# Patient Record
Sex: MALE | Race: WHITE | NOT HISPANIC OR LATINO | URBAN - METROPOLITAN AREA
[De-identification: names, ages, dates, MRNs, and addresses within clinical notes are randomized per-mention and may not be internally consistent; named-entity substitution may affect disease eponyms.]

---

## 2017-09-07 ENCOUNTER — GENERIC CONVERSION - ENCOUNTER (OUTPATIENT)
Dept: OTHER | Facility: OTHER | Age: 72
End: 2017-09-07

## 2017-09-14 RX ORDER — MULTIVITAMIN
1 TABLET ORAL DAILY
Status: ON HOLD | COMMUNITY
End: 2017-11-26

## 2017-09-14 RX ORDER — LOSARTAN POTASSIUM AND HYDROCHLOROTHIAZIDE 25; 100 MG/1; MG/1
1 TABLET ORAL DAILY
COMMUNITY

## 2017-09-14 RX ORDER — CHLORAL HYDRATE 500 MG
1000 CAPSULE ORAL DAILY
COMMUNITY

## 2017-09-14 RX ORDER — MONTELUKAST SODIUM 10 MG/1
10 TABLET ORAL
COMMUNITY

## 2017-09-14 RX ORDER — PANTOPRAZOLE SODIUM 40 MG/1
40 TABLET, DELAYED RELEASE ORAL DAILY
COMMUNITY

## 2017-09-14 RX ORDER — SIMVASTATIN 20 MG
20 TABLET ORAL
COMMUNITY

## 2017-09-14 RX ORDER — ASPIRIN 81 MG/1
162 TABLET, CHEWABLE ORAL DAILY
COMMUNITY

## 2017-09-14 NOTE — PRE-PROCEDURE INSTRUCTIONS
Pre-Surgery Instructions:   Medication Instructions    aspirin 81 mg chewable tablet Instructed patient per Anesthesia Guidelines   losartan-hydrochlorothiazide (HYZAAR) 100-25 MG per tablet Instructed patient per Anesthesia Guidelines   montelukast (SINGULAIR) 10 mg tablet Instructed patient per Anesthesia Guidelines   Multiple Vitamin (MULTIVITAMIN) tablet Instructed patient per Anesthesia Guidelines   Omega-3 Fatty Acids (FISH OIL) 1,000 mg Instructed patient per Anesthesia Guidelines   pantoprazole (PROTONIX) 40 mg tablet Instructed patient per Anesthesia Guidelines   simvastatin (ZOCOR) 20 mg tablet Instructed patient per Anesthesia Guidelines  Spoke to patient via telephone  As per anesthesia guidelines patient instructed to take protonix am of surgery with a sip of water  Patient instructed to stop all Aspirin/Vitamins/Supplements/NSAIDs 7 days before surgery  [facility] pre-op instructions reviewed with patient  Pre-op bathing reviewed with patient and patient instructed to  chlorhexidine soap

## 2017-09-25 ENCOUNTER — ANESTHESIA EVENT (OUTPATIENT)
Dept: PERIOP | Facility: HOSPITAL | Age: 72
End: 2017-09-25
Payer: MEDICARE

## 2017-09-26 ENCOUNTER — HOSPITAL ENCOUNTER (OUTPATIENT)
Facility: HOSPITAL | Age: 72
Setting detail: OUTPATIENT SURGERY
Discharge: HOME | End: 2017-09-26
Attending: OTOLARYNGOLOGY | Admitting: OTOLARYNGOLOGY
Payer: MEDICARE

## 2017-09-26 ENCOUNTER — GENERIC CONVERSION - ENCOUNTER (OUTPATIENT)
Dept: OTHER | Facility: OTHER | Age: 72
End: 2017-09-26

## 2017-09-26 ENCOUNTER — ANESTHESIA (OUTPATIENT)
Dept: PERIOP | Facility: HOSPITAL | Age: 72
End: 2017-09-26
Payer: MEDICARE

## 2017-09-26 VITALS
WEIGHT: 210 LBS | OXYGEN SATURATION: 93 % | HEIGHT: 72 IN | BODY MASS INDEX: 28.44 KG/M2 | HEART RATE: 64 BPM | RESPIRATION RATE: 17 BRPM | TEMPERATURE: 97.7 F | SYSTOLIC BLOOD PRESSURE: 119 MMHG | DIASTOLIC BLOOD PRESSURE: 71 MMHG

## 2017-09-26 DIAGNOSIS — C32.0 MALIGNANT NEOPLASM OF GLOTTIS (HCC): ICD-10-CM

## 2017-09-26 PROCEDURE — 88305 TISSUE EXAM BY PATHOLOGIST: CPT | Performed by: OTOLARYNGOLOGY

## 2017-09-26 PROCEDURE — 88342 IMHCHEM/IMCYTCHM 1ST ANTB: CPT | Performed by: OTOLARYNGOLOGY

## 2017-09-26 RX ORDER — ONDANSETRON 2 MG/ML
4 INJECTION INTRAMUSCULAR; INTRAVENOUS EVERY 6 HOURS PRN
Status: DISCONTINUED | OUTPATIENT
Start: 2017-09-26 | End: 2017-09-26 | Stop reason: HOSPADM

## 2017-09-26 RX ORDER — FENTANYL CITRATE 50 UG/ML
INJECTION, SOLUTION INTRAMUSCULAR; INTRAVENOUS AS NEEDED
Status: DISCONTINUED | OUTPATIENT
Start: 2017-09-26 | End: 2017-09-26 | Stop reason: SURG

## 2017-09-26 RX ORDER — ROCURONIUM BROMIDE 10 MG/ML
INJECTION, SOLUTION INTRAVENOUS AS NEEDED
Status: DISCONTINUED | OUTPATIENT
Start: 2017-09-26 | End: 2017-09-26 | Stop reason: SURG

## 2017-09-26 RX ORDER — GLYCOPYRROLATE 0.2 MG/ML
INJECTION INTRAMUSCULAR; INTRAVENOUS AS NEEDED
Status: DISCONTINUED | OUTPATIENT
Start: 2017-09-26 | End: 2017-09-26 | Stop reason: SURG

## 2017-09-26 RX ORDER — LIDOCAINE HYDROCHLORIDE 10 MG/ML
INJECTION, SOLUTION INFILTRATION; PERINEURAL AS NEEDED
Status: DISCONTINUED | OUTPATIENT
Start: 2017-09-26 | End: 2017-09-26 | Stop reason: SURG

## 2017-09-26 RX ORDER — ALBUTEROL SULFATE 2.5 MG/3ML
2.5 SOLUTION RESPIRATORY (INHALATION) ONCE AS NEEDED
Status: DISCONTINUED | OUTPATIENT
Start: 2017-09-26 | End: 2017-09-26 | Stop reason: HOSPADM

## 2017-09-26 RX ORDER — MEPERIDINE HYDROCHLORIDE 50 MG/ML
12.5 INJECTION INTRAMUSCULAR; INTRAVENOUS; SUBCUTANEOUS AS NEEDED
Status: DISCONTINUED | OUTPATIENT
Start: 2017-09-26 | End: 2017-09-26 | Stop reason: HOSPADM

## 2017-09-26 RX ORDER — FENTANYL CITRATE/PF 50 MCG/ML
50 SYRINGE (ML) INJECTION
Status: DISCONTINUED | OUTPATIENT
Start: 2017-09-26 | End: 2017-09-26 | Stop reason: HOSPADM

## 2017-09-26 RX ORDER — MAGNESIUM HYDROXIDE 1200 MG/15ML
LIQUID ORAL AS NEEDED
Status: DISCONTINUED | OUTPATIENT
Start: 2017-09-26 | End: 2017-09-26 | Stop reason: HOSPADM

## 2017-09-26 RX ORDER — PROPOFOL 10 MG/ML
INJECTION, EMULSION INTRAVENOUS AS NEEDED
Status: DISCONTINUED | OUTPATIENT
Start: 2017-09-26 | End: 2017-09-26 | Stop reason: SURG

## 2017-09-26 RX ORDER — OXYCODONE HYDROCHLORIDE AND ACETAMINOPHEN 5; 325 MG/1; MG/1
2 TABLET ORAL EVERY 4 HOURS PRN
Status: DISCONTINUED | OUTPATIENT
Start: 2017-09-26 | End: 2017-09-26 | Stop reason: HOSPADM

## 2017-09-26 RX ORDER — SODIUM CHLORIDE 9 MG/ML
125 INJECTION, SOLUTION INTRAVENOUS CONTINUOUS
Status: DISCONTINUED | OUTPATIENT
Start: 2017-09-26 | End: 2017-09-26 | Stop reason: HOSPADM

## 2017-09-26 RX ORDER — OXYMETAZOLINE HYDROCHLORIDE 0.05 G/100ML
SPRAY NASAL AS NEEDED
Status: DISCONTINUED | OUTPATIENT
Start: 2017-09-26 | End: 2017-09-26 | Stop reason: HOSPADM

## 2017-09-26 RX ORDER — OXYCODONE HYDROCHLORIDE AND ACETAMINOPHEN 5; 325 MG/1; MG/1
TABLET ORAL
Refills: 0
Start: 2017-09-26 | End: 2017-11-27 | Stop reason: HOSPADM

## 2017-09-26 RX ADMIN — SODIUM CHLORIDE 125 ML/HR: 0.9 INJECTION, SOLUTION INTRAVENOUS at 06:57

## 2017-09-26 RX ADMIN — ROCURONIUM BROMIDE 30 MG: 10 INJECTION, SOLUTION INTRAVENOUS at 08:08

## 2017-09-26 RX ADMIN — NEOSTIGMINE METHYLSULFATE 3 MG: 1 INJECTION, SOLUTION INTRAMUSCULAR; INTRAVENOUS; SUBCUTANEOUS at 08:25

## 2017-09-26 RX ADMIN — PROPOFOL 200 MG: 10 INJECTION, EMULSION INTRAVENOUS at 08:08

## 2017-09-26 RX ADMIN — FENTANYL CITRATE 100 MCG: 50 INJECTION, SOLUTION INTRAMUSCULAR; INTRAVENOUS at 08:08

## 2017-09-26 RX ADMIN — DEXAMETHASONE SODIUM PHOSPHATE 8 MG: 10 INJECTION INTRAMUSCULAR; INTRAVENOUS at 08:13

## 2017-09-26 RX ADMIN — ONDANSETRON HYDROCHLORIDE 4 MG: 2 INJECTION, SOLUTION INTRAVENOUS at 08:31

## 2017-09-26 RX ADMIN — GLYCOPYRROLATE 0.4 MG: 0.2 INJECTION, SOLUTION INTRAMUSCULAR; INTRAVENOUS at 08:25

## 2017-09-26 RX ADMIN — LIDOCAINE HYDROCHLORIDE 60 MG: 10 INJECTION, SOLUTION INFILTRATION; PERINEURAL at 08:08

## 2017-09-26 NOTE — DISCHARGE INSTRUCTIONS
[facility]    POSTOPERATIVE LARYNGOSCOPY INSTRUCTION    1  If any vocal cord biopsies were taken, you should rest voiced for 7 days  He should not whisper or shout  2   If you have any severe pain not alleviated by medication, fever of 101°F or greater, or difficulty breathing, please call our office at [phone] or [phone] or go directly to the emergency room  3   No smoking  Avoid second hand smoke exposure  4   Coughing up blood mixed with mucus may be normal   Call for any significant bleeding

## 2017-09-26 NOTE — INTERVAL H&P NOTE
H&P reviewed  After examining the patient I find no changes in the patients condition since the H&P had been written

## 2017-09-26 NOTE — PROGRESS NOTES
D/C instructions reviewed w/ pt & spouse, verbalized understanding  Pt remains on voice rest, given paper to communicate on  Denies pain or difficulty swallowing  No bleeding noted

## 2017-10-13 ENCOUNTER — GENERIC CONVERSION - ENCOUNTER (OUTPATIENT)
Dept: OTHER | Facility: OTHER | Age: 72
End: 2017-10-13

## 2017-11-02 ENCOUNTER — APPOINTMENT (OUTPATIENT)
Dept: LAB | Facility: CLINIC | Age: 72
End: 2017-11-02
Payer: MEDICARE

## 2017-11-02 ENCOUNTER — ALLSCRIPTS OFFICE VISIT (OUTPATIENT)
Dept: OTHER | Facility: OTHER | Age: 72
End: 2017-11-02

## 2017-11-02 DIAGNOSIS — D13.2 BENIGN NEOPLASM OF DUODENUM: ICD-10-CM

## 2017-11-02 LAB
ALBUMIN SERPL BCP-MCNC: 3.5 G/DL (ref 3.5–5)
ALP SERPL-CCNC: 66 U/L (ref 46–116)
ALT SERPL W P-5'-P-CCNC: 26 U/L (ref 12–78)
ANION GAP SERPL CALCULATED.3IONS-SCNC: 6 MMOL/L (ref 4–13)
APTT PPP: 30 SECONDS (ref 23–35)
AST SERPL W P-5'-P-CCNC: 21 U/L (ref 5–45)
BASOPHILS # BLD AUTO: 0.03 THOUSANDS/ΜL (ref 0–0.1)
BASOPHILS NFR BLD AUTO: 1 % (ref 0–1)
BILIRUB SERPL-MCNC: 0.27 MG/DL (ref 0.2–1)
BUN SERPL-MCNC: 17 MG/DL (ref 5–25)
CALCIUM SERPL-MCNC: 8.7 MG/DL (ref 8.3–10.1)
CHLORIDE SERPL-SCNC: 105 MMOL/L (ref 100–108)
CO2 SERPL-SCNC: 30 MMOL/L (ref 21–32)
CREAT SERPL-MCNC: 0.92 MG/DL (ref 0.6–1.3)
EOSINOPHIL # BLD AUTO: 0.15 THOUSAND/ΜL (ref 0–0.61)
EOSINOPHIL NFR BLD AUTO: 3 % (ref 0–6)
ERYTHROCYTE [DISTWIDTH] IN BLOOD BY AUTOMATED COUNT: 16.2 % (ref 11.6–15.1)
EST. AVERAGE GLUCOSE BLD GHB EST-MCNC: 128 MG/DL
GFR SERPL CREATININE-BSD FRML MDRD: 83 ML/MIN/1.73SQ M
GLUCOSE P FAST SERPL-MCNC: 182 MG/DL (ref 65–99)
HBA1C MFR BLD: 6.1 % (ref 4.2–6.3)
HCT VFR BLD AUTO: 37.5 % (ref 36.5–49.3)
HGB BLD-MCNC: 12.6 G/DL (ref 12–17)
INR PPP: 1.06 (ref 0.86–1.16)
LYMPHOCYTES # BLD AUTO: 1.34 THOUSANDS/ΜL (ref 0.6–4.47)
LYMPHOCYTES NFR BLD AUTO: 27 % (ref 14–44)
MCH RBC QN AUTO: 28.6 PG (ref 26.8–34.3)
MCHC RBC AUTO-ENTMCNC: 33.6 G/DL (ref 31.4–37.4)
MCV RBC AUTO: 85 FL (ref 82–98)
MONOCYTES # BLD AUTO: 0.47 THOUSAND/ΜL (ref 0.17–1.22)
MONOCYTES NFR BLD AUTO: 10 % (ref 4–12)
NEUTROPHILS # BLD AUTO: 2.9 THOUSANDS/ΜL (ref 1.85–7.62)
NEUTS SEG NFR BLD AUTO: 59 % (ref 43–75)
NRBC BLD AUTO-RTO: 0 /100 WBCS
PLATELET # BLD AUTO: 192 THOUSANDS/UL (ref 149–390)
PMV BLD AUTO: 9.2 FL (ref 8.9–12.7)
POTASSIUM SERPL-SCNC: 3.8 MMOL/L (ref 3.5–5.3)
PROT SERPL-MCNC: 6.9 G/DL (ref 6.4–8.2)
PROTHROMBIN TIME: 13.8 SECONDS (ref 12.1–14.4)
RBC # BLD AUTO: 4.41 MILLION/UL (ref 3.88–5.62)
SODIUM SERPL-SCNC: 141 MMOL/L (ref 136–145)
WBC # BLD AUTO: 4.9 THOUSAND/UL (ref 4.31–10.16)

## 2017-11-02 PROCEDURE — 85730 THROMBOPLASTIN TIME PARTIAL: CPT

## 2017-11-02 PROCEDURE — 85610 PROTHROMBIN TIME: CPT

## 2017-11-02 PROCEDURE — 80053 COMPREHEN METABOLIC PANEL: CPT

## 2017-11-02 PROCEDURE — 85025 COMPLETE CBC W/AUTO DIFF WBC: CPT

## 2017-11-02 PROCEDURE — 83036 HEMOGLOBIN GLYCOSYLATED A1C: CPT

## 2017-11-02 PROCEDURE — 36415 COLL VENOUS BLD VENIPUNCTURE: CPT

## 2017-11-03 ENCOUNTER — HOSPITAL ENCOUNTER (OUTPATIENT)
Dept: RADIOLOGY | Facility: HOSPITAL | Age: 72
Discharge: HOME | End: 2017-11-03
Attending: RADIOLOGY
Payer: MEDICARE

## 2017-11-03 DIAGNOSIS — Z76.89 REFERRAL OF PATIENT WITHOUT EXAMINATION OR TREATMENT: ICD-10-CM

## 2017-11-03 NOTE — CONSULTS
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)    Plan  Duodenal adenoma    · Schedule Surgery Treatment  Procedure  Status: Hold For - Scheduling  Requested for:  [id number]   Ordered; For: Duodenal adenoma; Ordered By: Hernan Morris Performed:  Due: [id number]    Discussion/Summary  Discussion Summary:   80-year-old male with what appears to be a duodenal polyp with extensive high-grade dysplasia  Based on the previous endoscopy report it is unclear that this can be resected endoscopically  I've recommended that he consider surgical resection for this  Hopefully this is just a polyp that can be removed at the base  Alternatively, if this is a broad-based lesion involving the medial duodenal wall or pancreas, pancreaticoduodenectomy may be required  I explained the risks including bleeding, infection, recurrence, need for further surgery, wound complications, adjacent organ injury, anastomotic leak, sepsis, MI, DVT, stroke, pulmonary embolism, and death  Informed consent was obtained  We will schedule this at our earliest mutual convenience  He is agreeable to this  All his questions were answered  Goals and Barriers: The patient has the current Goals: Cure  The patent has the current Barriers: None  Patient's Capacity to Self-Care: Patient is able to Self-Care  Medication SE Review and Pt Understands Tx: The treatment plan was reviewed with the patient/guardian  The patient/guardian understands and agrees with the treatment plan   Self Referrals:   Self Referrals: No      Chief Complaint  Chief Complaint Free Text Note Form: Pt here for consult for dysplastic duodenal adenoma, found on recent PET  Pt recently dx'd with head/neck SCC  C/o hoarseness, a feeling of fullness and occasional nausea  Denies pain, vomiting or weight loss  History of Present Illness  HPI: 59-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   He was found to have a recurrence and had a staging workup with a PET/CT  There was intense uptake in the proximal duodenum with an SUV of 14 7  An intraluminal density was seen  The gastric antrum was dilated  I personally reviewed the films  The patient comes in now to discuss further therapy  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  Patient then underwent EGD on October 16, 2017  This revealed a large duodenal bulb mass protruding through the pylorus  This was friable  The duodenum could not be entered  Pathology revealed tubular adenoma with extensive high-grade dysplasia  The patient denies any abdominal pain, nausea or vomiting, he does have some occasional bloating but no significant reflux or nausea  No unintentional weight loss  Review of Systems  Complete Male ROS Surg Onc:   Constitutional: The patient denies new or recent history of general fatigue, no recent weight loss, no change in appetite  Eyes: No complaints of visual problems, no scleral icterus  ENT: hoarseness, but-- no earache,-- no nosebleeds,-- no sore throat,-- no hearing loss,-- no difficultry swallowing,-- no tinnitus-- and-- no new masses in head, oral cavity, or neck  Cardiovascular: No complaints of chest pain, no palpitations, no ankle edema  Respiratory: No complaints of shortness of breath, no cough  Gastrointestinal: nausea, but-- no abdominal pain,-- no vomiting,-- no constipation,-- no diarrhea,-- no blood in stools,-- no jaundice-- and-- no pale stools  Genitourinary: No complaints of dysuria, no hematuria, no nocturia, no frequent urination, no urethral discharge  Musculoskeletal: No complaints of weakness, paralysis, joint stiffness or arthralgias,  Integumentary: No complaints of rash, no new lesions  Neurological: No complaints of convulsions, no seizures, no dizziness  Hematologic/Lymphatic: No complaints of easy bruising  ROS Reviewed:   ROS reviewed  Past Medical History  1   History of Acute appendicitis (540 9) (K35 80)   2  History of colonic polyps (V12 72) (Z86 010)  Active Problems And Past Medical History Reviewed: The active problems and past medical history were reviewed and updated today  Surgical History  1  History of Appendectomy   2  History of Hernia Repair   3  History of Prostate Surgery   4  History of Radiation Therapy  Surgical History Reviewed: The surgical history was reviewed and updated today  Family History  Mother    1  Family history of malignant neoplasm of esophagus (V16 0) (Z80 0)  Father    2  No pertinent family history  Sister    3  Family history of malignant neoplasm of cervix (V16 49) (Z80 49)  Family History Reviewed: The family history was reviewed and updated today  Social History   · Former smoker (V24 71) (F73 091)   · No alcohol use  Social History Reviewed: The social history was reviewed and updated today  Current Meds   1  Adult Aspirin EC Low Strength 81 MG Oral Tablet Delayed Release; Therapy: (Recorded:02Nov2017) to Recorded   2  Losartan Potassium-HCTZ 100-25 MG Oral Tablet; Therapy: (Recorded:02Nov2017) to Recorded   3  Montelukast Sodium 10 MG Oral Tablet; Therapy: [phone]) to Recorded   4  Pantoprazole Sodium 40 MG Oral Tablet Delayed Release; Therapy: [phone]) to Recorded   5  Simvastatin 20 MG Oral Tablet; Therapy: [phone]) to Recorded  Medication List Reviewed: The medication list was reviewed and updated today  Allergies  1  No Known Drug Allergies    Vitals  Vital Signs    Recorded: [id number] 09:56AM   Temperature 97 7 F   Heart Rate 70   Respiration 18   Systolic 539   Diastolic 70   Height 6 ft 2 in   Weight 214 lb    BMI Calculated 27 48   BSA Calculated 2 24     Physical Exam    Constitutional: General appearance: The Patient is well-developed, well-nourished male who appears his stated age in no acute distress  He is pleasant and talkative      HEENT: ALPESH, EOMI and the sclerae are anicteric  -- There is no oral pathology noted  -- There is no nasal pathology noted  -- The thyroid is normal to inspection and palpation  -- There is no appreciable cervical adenopathy   -- There are no carotid bruits  -- The Cranial Nerves II - XII are grossly intact  -- Neck is supple without adenopathy  Chest: The chest is normal to inspection  -- The lungs are clear to auscultation  -- There are bilaterally symmetrical breath sounds  -- There is a RRR, normal S1 and S2, without murmurs, rub or gallop  -- The pulses are normal at the radial and dorsalis pedis and symmetrical     Abdomen: The abdomen is normal to inspection and percussion  It is soft, non-tender  There are normal bowel sounds  There are no abnormal masses  -- There is no evidence of hepatosplenomegaly  -- He does not have an umbilical hernia  Extremities: There is no clubbing or cyanosis  -- There is no edema  -- Sensation is intact to light touch  Neuro: Grossly nonfocal     Lymphatic: no evidence of cervical adenopathy bilaterally  -- no evidence of axillary adenopathy bilaterally  -- no evidence of inguinal adenopathy bilaterally  Skin: Warm, anicteric         Signatures   Electronically signed by : GAUTAM Lunsford ; Nov 2 2017 10:28AM EST                       (Author)

## 2017-11-07 ENCOUNTER — GENERIC CONVERSION - ENCOUNTER (OUTPATIENT)
Dept: OTHER | Facility: OTHER | Age: 72
End: 2017-11-07

## 2017-11-15 ENCOUNTER — ANESTHESIA EVENT (OUTPATIENT)
Dept: PERIOP | Facility: HOSPITAL | Age: 72
DRG: 328 | End: 2017-11-15
Payer: MEDICARE

## 2017-11-17 NOTE — PRE-PROCEDURE INSTRUCTIONS
No outpatient prescriptions have been marked as taking for the 11/20/17 encounter [facility] Encounter)  1  Hx of throat CA - he has throat hoarseness times 2 years with a raspy voice  Had radiation 2 years sgo   had a bx this year with no complications  Denies dysphasia, denies choking, eats regular diet  Thin liquids  Solid foods  Denies swollen neck  Denies pain with swallowing  States all WNL  He can walk up a flight of stairs  No CP   Mets greater than 4  States he can run up stairs  Denies CP and SOB

## 2017-11-20 ENCOUNTER — HOSPITAL ENCOUNTER (INPATIENT)
Facility: HOSPITAL | Age: 72
LOS: 7 days | Discharge: HOME | DRG: 328 | End: 2017-11-27
Attending: SURGERY | Admitting: SURGERY
Payer: MEDICARE

## 2017-11-20 ENCOUNTER — ANESTHESIA (OUTPATIENT)
Dept: PERIOP | Facility: HOSPITAL | Age: 72
DRG: 328 | End: 2017-11-20
Payer: MEDICARE

## 2017-11-20 DIAGNOSIS — D13.2 BENIGN NEOPLASM OF DUODENUM: ICD-10-CM

## 2017-11-20 PROBLEM — K31.89 DUODENAL MASS: Status: ACTIVE | Noted: 2017-11-20

## 2017-11-20 LAB
ABO GROUP BLD: NORMAL
ANION GAP SERPL CALCULATED.3IONS-SCNC: 8 MMOL/L (ref 4–13)
BASE EXCESS BLDA CALC-SCNC: -1 MMOL/L (ref -2–3)
BLD GP AB SCN SERPL QL: POSITIVE
BLOOD GROUP ANTIBODIES SERPL: NORMAL
BLOOD GROUP ANTIBODIES SERPL: NORMAL
BUN SERPL-MCNC: 14 MG/DL (ref 5–25)
CA-I BLD-SCNC: 1.21 MMOL/L (ref 1.12–1.32)
CALCIUM SERPL-MCNC: 8.3 MG/DL (ref 8.3–10.1)
CHLORIDE SERPL-SCNC: 106 MMOL/L (ref 100–108)
CO2 SERPL-SCNC: 27 MMOL/L (ref 21–32)
CREAT SERPL-MCNC: 0.83 MG/DL (ref 0.6–1.3)
E AG RBC QL: NEGATIVE
ERYTHROCYTE [DISTWIDTH] IN BLOOD BY AUTOMATED COUNT: 13.6 % (ref 11.6–15.1)
GFR SERPL CREATININE-BSD FRML MDRD: 88 ML/MIN/1.73SQ M
GLUCOSE SERPL-MCNC: 139 MG/DL (ref 65–140)
GLUCOSE SERPL-MCNC: 158 MG/DL (ref 65–140)
GLUCOSE SERPL-MCNC: 173 MG/DL (ref 65–140)
HCO3 BLDA-SCNC: 25.5 MMOL/L (ref 24–30)
HCT VFR BLD AUTO: 34.8 % (ref 36.5–49.3)
HCT VFR BLD CALC: 34 % (ref 36.5–49.3)
HGB BLD-MCNC: 12.2 G/DL (ref 12–17)
HGB BLDA-MCNC: 11.6 G/DL (ref 12–17)
MAGNESIUM SERPL-MCNC: 1.9 MG/DL (ref 1.6–2.6)
MCH RBC QN AUTO: 29.2 PG (ref 26.8–34.3)
MCHC RBC AUTO-ENTMCNC: 35.1 G/DL (ref 31.4–37.4)
MCV RBC AUTO: 83 FL (ref 82–98)
PCO2 BLD: 27 MMOL/L (ref 21–32)
PCO2 BLD: 47.9 MM HG (ref 42–50)
PH BLD: 7.33 [PH] (ref 7.3–7.4)
PLATELET # BLD AUTO: 167 THOUSANDS/UL (ref 149–390)
PMV BLD AUTO: 8.5 FL (ref 8.9–12.7)
PO2 BLD: 136 MM HG (ref 35–45)
POTASSIUM BLD-SCNC: 3.6 MMOL/L (ref 3.5–5.3)
POTASSIUM SERPL-SCNC: 3.7 MMOL/L (ref 3.5–5.3)
RBC # BLD AUTO: 4.18 MILLION/UL (ref 3.88–5.62)
RH BLD: NEGATIVE
SAO2 % BLD FROM PO2: 99 % (ref 95–98)
SODIUM BLD-SCNC: 139 MMOL/L (ref 136–145)
SODIUM SERPL-SCNC: 141 MMOL/L (ref 136–145)
SPECIMEN EXPIRATION DATE: NORMAL
SPECIMEN SOURCE: ABNORMAL
WBC # BLD AUTO: 7.53 THOUSAND/UL (ref 4.31–10.16)

## 2017-11-20 PROCEDURE — 86922 COMPATIBILITY TEST ANTIGLOB: CPT

## 2017-11-20 PROCEDURE — 82330 ASSAY OF CALCIUM: CPT

## 2017-11-20 PROCEDURE — 85027 COMPLETE CBC AUTOMATED: CPT | Performed by: SURGERY

## 2017-11-20 PROCEDURE — 88304 TISSUE EXAM BY PATHOLOGIST: CPT | Performed by: SURGERY

## 2017-11-20 PROCEDURE — 03HY32Z INSERTION OF MONITORING DEVICE INTO UPPER ARTERY, PERCUTANEOUS APPROACH: ICD-10-PCS | Performed by: SURGERY

## 2017-11-20 PROCEDURE — 4A133B1 MONITORING OF ARTERIAL PRESSURE, PERIPHERAL, PERCUTANEOUS APPROACH: ICD-10-PCS | Performed by: SURGERY

## 2017-11-20 PROCEDURE — 80048 BASIC METABOLIC PNL TOTAL CA: CPT | Performed by: SURGERY

## 2017-11-20 PROCEDURE — 0DU907Z SUPPLEMENT DUODENUM WITH AUTOLOGOUS TISSUE SUBSTITUTE, OPEN APPROACH: ICD-10-PCS | Performed by: SURGERY

## 2017-11-20 PROCEDURE — 82947 ASSAY GLUCOSE BLOOD QUANT: CPT

## 2017-11-20 PROCEDURE — 88309 TISSUE EXAM BY PATHOLOGIST: CPT | Performed by: SURGERY

## 2017-11-20 PROCEDURE — 82948 REAGENT STRIP/BLOOD GLUCOSE: CPT

## 2017-11-20 PROCEDURE — 88342 IMHCHEM/IMCYTCHM 1ST ANTB: CPT | Performed by: SURGERY

## 2017-11-20 PROCEDURE — 4A133J1 MONITORING OF ARTERIAL PULSE, PERIPHERAL, PERCUTANEOUS APPROACH: ICD-10-PCS | Performed by: SURGERY

## 2017-11-20 PROCEDURE — 0DB60ZZ EXCISION OF STOMACH, OPEN APPROACH: ICD-10-PCS | Performed by: SURGERY

## 2017-11-20 PROCEDURE — 88307 TISSUE EXAM BY PATHOLOGIST: CPT | Performed by: SURGERY

## 2017-11-20 PROCEDURE — C9113 INJ PANTOPRAZOLE SODIUM, VIA: HCPCS | Performed by: SURGERY

## 2017-11-20 PROCEDURE — 84295 ASSAY OF SERUM SODIUM: CPT

## 2017-11-20 PROCEDURE — 84132 ASSAY OF SERUM POTASSIUM: CPT

## 2017-11-20 PROCEDURE — 85014 HEMATOCRIT: CPT

## 2017-11-20 PROCEDURE — 86905 BLOOD TYPING RBC ANTIGENS: CPT

## 2017-11-20 PROCEDURE — 86921 COMPATIBILITY TEST INCUBATE: CPT

## 2017-11-20 PROCEDURE — 86850 RBC ANTIBODY SCREEN: CPT | Performed by: SURGERY

## 2017-11-20 PROCEDURE — 94762 N-INVAS EAR/PLS OXIMTRY CONT: CPT

## 2017-11-20 PROCEDURE — 86870 RBC ANTIBODY IDENTIFICATION: CPT | Performed by: SURGERY

## 2017-11-20 PROCEDURE — 83735 ASSAY OF MAGNESIUM: CPT | Performed by: SURGERY

## 2017-11-20 PROCEDURE — 82803 BLOOD GASES ANY COMBINATION: CPT

## 2017-11-20 PROCEDURE — 86900 BLOOD TYPING SEROLOGIC ABO: CPT | Performed by: SURGERY

## 2017-11-20 PROCEDURE — 86901 BLOOD TYPING SEROLOGIC RH(D): CPT | Performed by: SURGERY

## 2017-11-20 PROCEDURE — 88341 IMHCHEM/IMCYTCHM EA ADD ANTB: CPT | Performed by: SURGERY

## 2017-11-20 RX ORDER — LIDOCAINE HYDROCHLORIDE 10 MG/ML
INJECTION, SOLUTION INFILTRATION; PERINEURAL AS NEEDED
Status: DISCONTINUED | OUTPATIENT
Start: 2017-11-20 | End: 2017-11-20 | Stop reason: SURG

## 2017-11-20 RX ORDER — ONDANSETRON 2 MG/ML
4 INJECTION INTRAMUSCULAR; INTRAVENOUS EVERY 4 HOURS PRN
Status: DISCONTINUED | OUTPATIENT
Start: 2017-11-20 | End: 2017-11-20 | Stop reason: SDUPTHER

## 2017-11-20 RX ORDER — ONDANSETRON 2 MG/ML
4 INJECTION INTRAMUSCULAR; INTRAVENOUS ONCE AS NEEDED
Status: DISCONTINUED | OUTPATIENT
Start: 2017-11-20 | End: 2017-11-20 | Stop reason: HOSPADM

## 2017-11-20 RX ORDER — SUCCINYLCHOLINE CHLORIDE 20 MG/ML
INJECTION INTRAMUSCULAR; INTRAVENOUS AS NEEDED
Status: DISCONTINUED | OUTPATIENT
Start: 2017-11-20 | End: 2017-11-20 | Stop reason: SURG

## 2017-11-20 RX ORDER — MIDAZOLAM HYDROCHLORIDE 1 MG/ML
INJECTION INTRAMUSCULAR; INTRAVENOUS AS NEEDED
Status: DISCONTINUED | OUTPATIENT
Start: 2017-11-20 | End: 2017-11-20 | Stop reason: SURG

## 2017-11-20 RX ORDER — SODIUM CHLORIDE, SODIUM LACTATE, POTASSIUM CHLORIDE, CALCIUM CHLORIDE 600; 310; 30; 20 MG/100ML; MG/100ML; MG/100ML; MG/100ML
20 INJECTION, SOLUTION INTRAVENOUS CONTINUOUS
Status: DISCONTINUED | OUTPATIENT
Start: 2017-11-20 | End: 2017-11-23

## 2017-11-20 RX ORDER — SODIUM CHLORIDE, SODIUM LACTATE, POTASSIUM CHLORIDE, CALCIUM CHLORIDE 600; 310; 30; 20 MG/100ML; MG/100ML; MG/100ML; MG/100ML
50 INJECTION, SOLUTION INTRAVENOUS CONTINUOUS
Status: DISCONTINUED | OUTPATIENT
Start: 2017-11-20 | End: 2017-11-20

## 2017-11-20 RX ORDER — SODIUM CHLORIDE 9 MG/ML
125 INJECTION, SOLUTION INTRAVENOUS CONTINUOUS
Status: DISCONTINUED | OUTPATIENT
Start: 2017-11-20 | End: 2017-11-22

## 2017-11-20 RX ORDER — PROPOFOL 10 MG/ML
INJECTION, EMULSION INTRAVENOUS AS NEEDED
Status: DISCONTINUED | OUTPATIENT
Start: 2017-11-20 | End: 2017-11-20 | Stop reason: SURG

## 2017-11-20 RX ORDER — NALOXONE HYDROCHLORIDE 0.4 MG/ML
0.1 INJECTION, SOLUTION INTRAMUSCULAR; INTRAVENOUS; SUBCUTANEOUS AS NEEDED
Status: DISCONTINUED | OUTPATIENT
Start: 2017-11-20 | End: 2017-11-26

## 2017-11-20 RX ORDER — ALBUMIN, HUMAN INJ 5% 5 %
SOLUTION INTRAVENOUS CONTINUOUS PRN
Status: DISCONTINUED | OUTPATIENT
Start: 2017-11-20 | End: 2017-11-20 | Stop reason: SURG

## 2017-11-20 RX ORDER — ONDANSETRON 2 MG/ML
4 INJECTION INTRAMUSCULAR; INTRAVENOUS EVERY 4 HOURS PRN
Status: DISCONTINUED | OUTPATIENT
Start: 2017-11-20 | End: 2017-11-27 | Stop reason: HOSPADM

## 2017-11-20 RX ORDER — PANTOPRAZOLE SODIUM 40 MG/1
40 INJECTION, POWDER, FOR SOLUTION INTRAVENOUS
Status: DISCONTINUED | OUTPATIENT
Start: 2017-11-20 | End: 2017-11-26

## 2017-11-20 RX ORDER — METOCLOPRAMIDE HYDROCHLORIDE 5 MG/ML
10 INJECTION INTRAMUSCULAR; INTRAVENOUS ONCE
Status: COMPLETED | OUTPATIENT
Start: 2017-11-20 | End: 2017-11-20

## 2017-11-20 RX ORDER — GLYCOPYRROLATE 0.2 MG/ML
INJECTION INTRAMUSCULAR; INTRAVENOUS AS NEEDED
Status: DISCONTINUED | OUTPATIENT
Start: 2017-11-20 | End: 2017-11-20 | Stop reason: SURG

## 2017-11-20 RX ORDER — FENTANYL CITRATE 50 UG/ML
INJECTION, SOLUTION INTRAMUSCULAR; INTRAVENOUS AS NEEDED
Status: DISCONTINUED | OUTPATIENT
Start: 2017-11-20 | End: 2017-11-20 | Stop reason: SURG

## 2017-11-20 RX ORDER — METOCLOPRAMIDE HYDROCHLORIDE 5 MG/ML
5 INJECTION INTRAMUSCULAR; INTRAVENOUS EVERY 6 HOURS PRN
Status: DISCONTINUED | OUTPATIENT
Start: 2017-11-20 | End: 2017-11-27 | Stop reason: HOSPADM

## 2017-11-20 RX ORDER — EPHEDRINE SULFATE 50 MG/ML
INJECTION, SOLUTION INTRAVENOUS AS NEEDED
Status: DISCONTINUED | OUTPATIENT
Start: 2017-11-20 | End: 2017-11-20 | Stop reason: SURG

## 2017-11-20 RX ORDER — HEPARIN SODIUM 5000 [USP'U]/ML
5000 INJECTION, SOLUTION INTRAVENOUS; SUBCUTANEOUS EVERY 12 HOURS SCHEDULED
Status: DISCONTINUED | OUTPATIENT
Start: 2017-11-20 | End: 2017-11-25

## 2017-11-20 RX ORDER — ROCURONIUM BROMIDE 10 MG/ML
INJECTION, SOLUTION INTRAVENOUS AS NEEDED
Status: DISCONTINUED | OUTPATIENT
Start: 2017-11-20 | End: 2017-11-20 | Stop reason: SURG

## 2017-11-20 RX ORDER — ONDANSETRON 2 MG/ML
INJECTION INTRAMUSCULAR; INTRAVENOUS AS NEEDED
Status: DISCONTINUED | OUTPATIENT
Start: 2017-11-20 | End: 2017-11-20 | Stop reason: SURG

## 2017-11-20 RX ORDER — MAGNESIUM HYDROXIDE 1200 MG/15ML
LIQUID ORAL AS NEEDED
Status: DISCONTINUED | OUTPATIENT
Start: 2017-11-20 | End: 2017-11-20 | Stop reason: HOSPADM

## 2017-11-20 RX ORDER — SODIUM CHLORIDE 9 MG/ML
INJECTION, SOLUTION INTRAVENOUS CONTINUOUS PRN
Status: DISCONTINUED | OUTPATIENT
Start: 2017-11-20 | End: 2017-11-20 | Stop reason: SURG

## 2017-11-20 RX ORDER — MONTELUKAST SODIUM 10 MG/1
10 TABLET ORAL
Status: DISCONTINUED | OUTPATIENT
Start: 2017-11-20 | End: 2017-11-27 | Stop reason: HOSPADM

## 2017-11-20 RX ADMIN — LIDOCAINE HYDROCHLORIDE 50 MG: 10 INJECTION, SOLUTION INFILTRATION; PERINEURAL at 11:08

## 2017-11-20 RX ADMIN — METRONIDAZOLE 500 MG: 500 INJECTION, SOLUTION INTRAVENOUS at 11:20

## 2017-11-20 RX ADMIN — FENTANYL CITRATE 25 MCG: 50 INJECTION, SOLUTION INTRAMUSCULAR; INTRAVENOUS at 13:23

## 2017-11-20 RX ADMIN — ALBUMIN HUMAN: 0.05 INJECTION, SOLUTION INTRAVENOUS at 11:55

## 2017-11-20 RX ADMIN — SODIUM CHLORIDE 125 ML/HR: 0.9 INJECTION, SOLUTION INTRAVENOUS at 20:56

## 2017-11-20 RX ADMIN — SODIUM CHLORIDE: 0.9 INJECTION, SOLUTION INTRAVENOUS at 11:13

## 2017-11-20 RX ADMIN — PANTOPRAZOLE SODIUM 40 MG: 40 INJECTION, POWDER, FOR SOLUTION INTRAVENOUS at 16:10

## 2017-11-20 RX ADMIN — EPHEDRINE SULFATE 10 MG: 50 INJECTION, SOLUTION INTRAMUSCULAR; INTRAVENOUS; SUBCUTANEOUS at 11:37

## 2017-11-20 RX ADMIN — SODIUM CHLORIDE, SODIUM LACTATE, POTASSIUM CHLORIDE, AND CALCIUM CHLORIDE 50 ML/HR: .6; .31; .03; .02 INJECTION, SOLUTION INTRAVENOUS at 09:12

## 2017-11-20 RX ADMIN — ROCURONIUM BROMIDE 10 MG: 10 INJECTION INTRAVENOUS at 11:08

## 2017-11-20 RX ADMIN — GLYCOPYRROLATE 0.2 MG: 0.2 INJECTION, SOLUTION INTRAMUSCULAR; INTRAVENOUS at 11:40

## 2017-11-20 RX ADMIN — METOCLOPRAMIDE 10 MG: 5 INJECTION, SOLUTION INTRAMUSCULAR; INTRAVENOUS at 15:01

## 2017-11-20 RX ADMIN — Medication: at 14:32

## 2017-11-20 RX ADMIN — HEPARIN SODIUM 5000 UNITS: 5000 INJECTION, SOLUTION INTRAVENOUS; SUBCUTANEOUS at 21:07

## 2017-11-20 RX ADMIN — ONDANSETRON 4 MG: 2 INJECTION INTRAMUSCULAR; INTRAVENOUS at 21:15

## 2017-11-20 RX ADMIN — ONDANSETRON 4 MG: 2 INJECTION INTRAMUSCULAR; INTRAVENOUS at 13:30

## 2017-11-20 RX ADMIN — HYDROMORPHONE HYDROCHLORIDE 0.4 MG: 1 INJECTION, SOLUTION INTRAMUSCULAR; INTRAVENOUS; SUBCUTANEOUS at 14:30

## 2017-11-20 RX ADMIN — SUGAMMADEX 380 MG: 100 INJECTION, SOLUTION INTRAVENOUS at 13:45

## 2017-11-20 RX ADMIN — ROCURONIUM BROMIDE 20 MG: 10 INJECTION INTRAVENOUS at 12:05

## 2017-11-20 RX ADMIN — SODIUM CHLORIDE, SODIUM LACTATE, POTASSIUM CHLORIDE, AND CALCIUM CHLORIDE: .6; .31; .03; .02 INJECTION, SOLUTION INTRAVENOUS at 10:50

## 2017-11-20 RX ADMIN — PROPOFOL 170 MG: 10 INJECTION, EMULSION INTRAVENOUS at 11:08

## 2017-11-20 RX ADMIN — FENTANYL CITRATE 25 MCG: 50 INJECTION, SOLUTION INTRAMUSCULAR; INTRAVENOUS at 11:20

## 2017-11-20 RX ADMIN — SUCCINYLCHOLINE CHLORIDE 100 MG: 20 INJECTION, SOLUTION INTRAMUSCULAR; INTRAVENOUS at 11:09

## 2017-11-20 RX ADMIN — MIDAZOLAM HYDROCHLORIDE 2 MG: 1 INJECTION, SOLUTION INTRAMUSCULAR; INTRAVENOUS at 11:03

## 2017-11-20 RX ADMIN — CEFAZOLIN SODIUM 2000 MG: 2 SOLUTION INTRAVENOUS at 11:15

## 2017-11-20 RX ADMIN — DEXAMETHASONE SODIUM PHOSPHATE 10 MG: 10 INJECTION INTRAMUSCULAR; INTRAVENOUS at 11:45

## 2017-11-20 RX ADMIN — ROCURONIUM BROMIDE 10 MG: 10 INJECTION INTRAVENOUS at 13:20

## 2017-11-20 RX ADMIN — ALBUMIN HUMAN: 0.05 INJECTION, SOLUTION INTRAVENOUS at 13:05

## 2017-11-20 RX ADMIN — ONDANSETRON 4 MG: 2 INJECTION INTRAMUSCULAR; INTRAVENOUS at 14:13

## 2017-11-20 RX ADMIN — EPHEDRINE SULFATE 10 MG: 50 INJECTION, SOLUTION INTRAMUSCULAR; INTRAVENOUS; SUBCUTANEOUS at 11:08

## 2017-11-20 RX ADMIN — SODIUM CHLORIDE 125 ML/HR: 0.9 INJECTION, SOLUTION INTRAVENOUS at 15:03

## 2017-11-20 RX ADMIN — ROCURONIUM BROMIDE 40 MG: 10 INJECTION INTRAVENOUS at 11:21

## 2017-11-20 RX ADMIN — HYDROMORPHONE HYDROCHLORIDE 0.4 MG: 1 INJECTION, SOLUTION INTRAMUSCULAR; INTRAVENOUS; SUBCUTANEOUS at 14:21

## 2017-11-20 RX ADMIN — FENTANYL CITRATE 100 MCG: 50 INJECTION, SOLUTION INTRAMUSCULAR; INTRAVENOUS at 11:08

## 2017-11-20 NOTE — PERIOPERATIVE NURSING NOTE
Pt still nauseated despite zofran given earlier  Dr Dino García called and made aware  Reglan ordered and given

## 2017-11-20 NOTE — ANESTHESIA PROCEDURE NOTES
Arterial Line Insertion  Date/Time: 11/20/2017 11:17 AM  Performed by: Svetlana Colunga  Authorized by: Svetlana Colunga   Consent: Verbal consent obtained  Written consent obtained  Preparation: Patient was prepped and draped in the usual sterile fashion    Indications: multiple ABGs and hemodynamic monitoring  Orientation:  RightLocation: radial artery    Sedation:  Patient sedated: yes (Performed under GA)  Venkat's test normal: yes  Needle gauge: 20  Number of attempts: 1  Post-procedure: dressing applied  Post-procedure CNS: normal  Patient tolerance: Patient tolerated the procedure well with no immediate complications

## 2017-11-20 NOTE — ANESTHESIA POSTPROCEDURE EVALUATION
Post-Op Assessment Note      CV Status:  Stable    Mental Status:  Alert and awake    Hydration Status:  Euvolemic    PONV Controlled:  Controlled    Airway Patency:  Patent    Post Op Vitals Reviewed: Yes          Staff: CRNA           BP   119/69   Temp (P) 97 5 °F (36 4 °C) (11/20/17 1402)    Pulse 97   Resp   18   SpO2 (P) 94 % (11/20/17 1402)

## 2017-11-20 NOTE — ANESTHESIA PREPROCEDURE EVALUATION
Review of Systems/Medical History  Patient summary reviewed  Chart reviewed  No history of anesthetic complications     Cardiovascular  Exercise tolerance: good,  Hyperlipidemia, Hypertension ,    Pulmonary  Smoker ex-smoker more than 10 packs per year , ,   Comment: Seasonal allergies    Vocal Cord / Throat CA: DL September 2017, XRT 2 years ago, chronic hoarseness      GI/Hepatic    GERD ,  Hiatal hernia,        Negative  ROS Prostatic disorder, history of prostate cancer       Endo/Other     GYN  Negative gynecology ROS          Hematology  Negative hematology ROS      Musculoskeletal  Negative musculoskeletal ROS        Neurology  Negative neurology ROS      Psychology           Physical Exam    Airway    Mallampati score: II  TM Distance: >3 FB  Neck ROM: full     Dental       Cardiovascular      Pulmonary      Other Findings        Anesthesia Plan  ASA Score- 2       Anesthesia Type- general and epidural with ASA Monitors  Additional Monitors: arterial line  Airway Plan: ETT  Comment: ETT: Thoracic Epidural for post op pain per surgeon request: Thorough discussion of an epidural presented as an option to patient, and he is agreeable after all questions answered          Induction- intravenous  Informed Consent- Anesthetic plan and risks discussed with patient  I personally reviewed this patient with the CRNA  Discussed and agreed on the Anesthesia Plan with the CRNA  Mirtha Jennings

## 2017-11-20 NOTE — ANESTHESIA PROCEDURE NOTES
Epidural Block    Patient location during procedure: holding area  Start time: 11/20/2017 9:55 AM  Reason for block: procedure for pain and at surgeon's request  Staffing  Anesthesiologist: Malena Matamoros  Performed: anesthesiologist   Preanesthetic Checklist  Completed: patient identified, site marked, surgical consent, pre-op evaluation, timeout performed, IV checked, risks and benefits discussed and monitors and equipment checked  Epidural  Patient position: sitting  Prep: ChloraPrep  Patient monitoring: cardiac monitor and frequent blood pressure checks  Approach: midline  Epidural location: T10-11    Injection technique: JAY saline  Needle  Needle type: Tuohy   Needle gauge: 18 G  Catheter type: side hole  Catheter size: 20 G  Catheter at skin depth: 11 5 cm  Test dose: negative and lidocaine 1 5% with epinephrine 1-to-200,000negative aspiration for CSF, negative aspiration for heme and no paresthesia on injection  patient tolerated the procedure well with no immediate complications  Additional Notes  Two attempts: secured with STAT-Lock, steri-strips, benzoin, and tegaderms

## 2017-11-20 NOTE — OP NOTE
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]    Procedure(s) (LRB):  EXPLORATORY LAPARATOMY; RESECTION DISTAL STOMACH AND FIRST OF SUODENAL; BILL GONG RECOSTRUCTION  (N/A)   Omental/Maxwell patch    Specimen(s):  ID Type Source Tests Collected by Time Destination   1 : W  PORTION DUODENAL AND DISTAL STOMACH  Tissue Stomach TISSUE EXAM Cathryn Marti MD 2017 1247    2 : FINAL MARGIN  Tissue Duodenum TISSUE EXAM Cathryn Marti MD 2017 1258    3 : donuts Tissue Soft Tissue, Other TISSUE EXAM Cathryn Marti MD 2017 1322        Estimated Blood Loss:   250 mL    Drains:       Anesthesia Type:   General    Operative Indications:  Benign neoplasm of duodenum [D152]  77-year-old male with a duodenal mass that revealed high-grade dysplasia  It was recommended that this area be resected  Risks and benefits were explained  Informed consent was obtained  Patient was brought to the operating room  In the preoperative holding area, the patient stated he would like to avoid any aggressive surgery if possible given that he is focused on quality of life and he is not going to have any type of aggressive surgical therapy for his recurrence head and neck cancer  Operative Findings:  Large duodenal mass  This could not be removed by just dividing the base  Consequently, a duodenal resection was performed  Complications:   None    Procedure and Technique:  After identifying the patient, general anesthesia was achieved  A Boudreaux catheter was placed  Lines were placed by Anesthesia  Patient was prepped and draped in the usual sterile fashion  A time-out was performed  An upper midline incision was made  Using sharp dissection this was taken through the skin, through the fascia and into the peritoneal cavity  Abdomen was explored and there was no evidence of metastasis  A Bookwalter retractor was placed  We now performed a Kocher maneuver on the duodenum  We then divided the gastrocolic omentum in an avascular plane getting into the lesser sac, and completely freeing up the distal stomach and the 1st portion of the duodenum  The mass in the duodenum was palpable and was in the 1st portion of the duodenum  This was at least 4 cm in size  We proceeded by placing stay sutures in the duodenum and then opened the duodenum longitudinally in an effort to elevate this mass and see if this could just be divided off a narrow stalk  Once we elevated this, it was clear that this mass was broad-based in the duodenum  The distal aspect of the duodenum that was free was the 2nd portion of the duodenum proximal to the ampulla and bile duct  Consequently, I felt that we could resect the 1st portion of the duodenum to completely remove this mass without performing pancreaticoduodenectomy  At this point the area suitable for division on the duodenum was identified and this was divided with 2 fires of a 45 blue load stapling device  Once this was completely free, we dissected this off of the hepatic duodenal ligament and taking this off the gastroduodenal artery  Once we were able to swing this medially off the pancreas this was just attached to the stomach  At this point along the greater curve, the gastroepiploic vessels were identified clamped, divided and ligated with 2 0 silk suture  The lesser curve was now freed and the lesser omentum was divided off the stomach in this region  We now found an area suitable for division of the distal stomach proximal to the pylorus and this was divided with sequential fires of a 45 blue load stapling device  Specimen was now handed off the table  Wound was copiously irrigated  I felt this could be reconstructed with a Billroth 1 anastomosis  We were able to remove the staple line from the duodenum  A 25 EEA stapling device was used  The anvil was placed in the duodenum and the duodenum was closed with a 2-0 Prolene suture in a pursestring fashion  At this point we now made a gastrotomy and placed the EEA stapler into the stomach and then brought the spear through the posterior wall  This was then attached to the anvil and the stapler was closed  We brought this together to make sure there was no extraneous tissue in the anastomosis  Stapler was fired, opened with 3 half turns and removed  Both the donuts were intact  The then the stomach was now closed with 2 fires of a 45 blue load stapler  Through the NG tube, the stomach was insufflated and there was no evidence of air leak through the duodenal last emesis  Wound was now copiously irrigated  The NG tube was placed on suction  Portion of omentum was now tacked onto the anastomosis as an omental patch/Maxwell patch  A 19 [place] Casie Aamir drain was placed through a separate stab incision and secured to the skin using 3 0 nylon suture  The drain was placed through the foramen of [place] posterior to the anastomosis  Wound was now copiously irrigated  There was excellent hemostasis  Bookwalter retractor was removed  Sponge and needle counts were correct  Fascia was approximated with 1  PDS suture starting at either end of the incision and secured centrally  Subcutaneous tissue was irrigated  Skin was approximated with staples  Sterile dressings were applied  Patient was extubated having tolerated the procedure well  I was present and participated in all aspects of this procedure         I was present for the entire procedure    Patient Disposition:  PACU     SIGNATURE: Ki Vieyra MD  DATE: November 20, 2017  TIME: 1:56 PM

## 2017-11-20 NOTE — RESPIRATORY THERAPY NOTE
RT Protocol Note  Karo Tomas 67 y o  male MRN: [id number]  Unit/Bed#: [facility] 607-01 Encounter: [id number]    Assessment    Principal Problem:    Duodenal mass      Home Pulmonary Medications:  none    Past Medical History:   Diagnosis Date    Cancer ([facility] )     Colon polyp     GERD (gastroesophageal reflux disease)     Hiatal hernia     Hyperlipidemia     Hypertension     Pancreatic adenoma     Prostate cancer ([facility] )     Radiation exposure     Seasonal allergies     Throat cancer ([facility] )     Vocal cord cancer (HCC)     Voice hoarseness     Wears glasses     reading     Social History     Social History    Marital status: /Civil Union     Spouse name: N/A    Number of children: N/A    Years of education: N/A     Social History Main Topics    Smoking status: Former Smoker    Smokeless tobacco: Never Used      Comment: quit about 3 years ago    Alcohol use No    Drug use: No    Sexual activity: Not Asked     Other Topics Concern    None     Social History Narrative    None       Subjective         Objective    Physical Exam:   Assessment Type: (P) Assess only  General Appearance: (P) Awake  Respiratory Pattern: (P) Normal  Chest Assessment: (P) Chest expansion symmetrical, Trachea midline  Bilateral Breath Sounds: (P) Clear  R Breath Sounds: (P) Clear  L Breath Sounds: (P) Clear    Vitals:  Blood pressure 127/73, pulse 91, temperature (!) 97 4 °F (36 3 °C), temperature source Oral, resp  rate 20, height 6' 2" (1 88 m), weight 95 3 kg (210 lb), SpO2 97 %  Imaging and other studies: I have personally reviewed pertinent reports  Plan    Respiratory Plan: (P) No distress/Pulmonary history, Discontinue Protocol        Resp Comments: (P) Pt admitted for surgical procedure  He is currently post op and appears comfortable  Pt denies sob/distress  Pt denies pulmonary hx  Will DC pt from respiratory protocol

## 2017-11-21 LAB
ANION GAP SERPL CALCULATED.3IONS-SCNC: 8 MMOL/L (ref 4–13)
BASOPHILS # BLD AUTO: 0 THOUSANDS/ΜL (ref 0–0.1)
BASOPHILS NFR BLD AUTO: 0 % (ref 0–1)
BUN SERPL-MCNC: 16 MG/DL (ref 5–25)
CALCIUM SERPL-MCNC: 8.6 MG/DL (ref 8.3–10.1)
CHLORIDE SERPL-SCNC: 105 MMOL/L (ref 100–108)
CO2 SERPL-SCNC: 27 MMOL/L (ref 21–32)
CREAT SERPL-MCNC: 0.72 MG/DL (ref 0.6–1.3)
EOSINOPHIL # BLD AUTO: 0 THOUSAND/ΜL (ref 0–0.61)
EOSINOPHIL NFR BLD AUTO: 0 % (ref 0–6)
ERYTHROCYTE [DISTWIDTH] IN BLOOD BY AUTOMATED COUNT: 13.6 % (ref 11.6–15.1)
GFR SERPL CREATININE-BSD FRML MDRD: 93 ML/MIN/1.73SQ M
GLUCOSE SERPL-MCNC: 126 MG/DL (ref 65–140)
HCT VFR BLD AUTO: 34.8 % (ref 36.5–49.3)
HGB BLD-MCNC: 11.9 G/DL (ref 12–17)
LYMPHOCYTES # BLD AUTO: 1.06 THOUSANDS/ΜL (ref 0.6–4.47)
LYMPHOCYTES NFR BLD AUTO: 11 % (ref 14–44)
MAGNESIUM SERPL-MCNC: 1.9 MG/DL (ref 1.6–2.6)
MCH RBC QN AUTO: 28.7 PG (ref 26.8–34.3)
MCHC RBC AUTO-ENTMCNC: 34.2 G/DL (ref 31.4–37.4)
MCV RBC AUTO: 84 FL (ref 82–98)
MONOCYTES # BLD AUTO: 0.68 THOUSAND/ΜL (ref 0.17–1.22)
MONOCYTES NFR BLD AUTO: 7 % (ref 4–12)
NEUTROPHILS # BLD AUTO: 8.16 THOUSANDS/ΜL (ref 1.85–7.62)
NEUTS SEG NFR BLD AUTO: 82 % (ref 43–75)
NRBC BLD AUTO-RTO: 0 /100 WBCS
PLATELET # BLD AUTO: 186 THOUSANDS/UL (ref 149–390)
PMV BLD AUTO: 8.8 FL (ref 8.9–12.7)
POTASSIUM SERPL-SCNC: 3.7 MMOL/L (ref 3.5–5.3)
RBC # BLD AUTO: 4.14 MILLION/UL (ref 3.88–5.62)
SODIUM SERPL-SCNC: 140 MMOL/L (ref 136–145)
WBC # BLD AUTO: 9.92 THOUSAND/UL (ref 4.31–10.16)

## 2017-11-21 PROCEDURE — 80048 BASIC METABOLIC PNL TOTAL CA: CPT | Performed by: SURGERY

## 2017-11-21 PROCEDURE — 85025 COMPLETE CBC W/AUTO DIFF WBC: CPT | Performed by: SURGERY

## 2017-11-21 PROCEDURE — 94760 N-INVAS EAR/PLS OXIMETRY 1: CPT

## 2017-11-21 PROCEDURE — 83735 ASSAY OF MAGNESIUM: CPT | Performed by: SURGERY

## 2017-11-21 PROCEDURE — 94762 N-INVAS EAR/PLS OXIMTRY CONT: CPT

## 2017-11-21 PROCEDURE — C9113 INJ PANTOPRAZOLE SODIUM, VIA: HCPCS | Performed by: SURGERY

## 2017-11-21 RX ORDER — POTASSIUM CHLORIDE 14.9 MG/ML
20 INJECTION INTRAVENOUS
Status: COMPLETED | OUTPATIENT
Start: 2017-11-21 | End: 2017-11-21

## 2017-11-21 RX ORDER — MAGNESIUM SULFATE HEPTAHYDRATE 40 MG/ML
2 INJECTION, SOLUTION INTRAVENOUS ONCE
Status: COMPLETED | OUTPATIENT
Start: 2017-11-21 | End: 2017-11-21

## 2017-11-21 RX ADMIN — SODIUM CHLORIDE 125 ML/HR: 0.9 INJECTION, SOLUTION INTRAVENOUS at 19:52

## 2017-11-21 RX ADMIN — POTASSIUM CHLORIDE 20 MEQ: 200 INJECTION, SOLUTION INTRAVENOUS at 11:23

## 2017-11-21 RX ADMIN — MAGNESIUM SULFATE HEPTAHYDRATE 2 G: 40 INJECTION, SOLUTION INTRAVENOUS at 11:36

## 2017-11-21 RX ADMIN — POTASSIUM CHLORIDE 20 MEQ: 200 INJECTION, SOLUTION INTRAVENOUS at 11:35

## 2017-11-21 RX ADMIN — Medication: at 18:34

## 2017-11-21 RX ADMIN — HEPARIN SODIUM 5000 UNITS: 5000 INJECTION, SOLUTION INTRAVENOUS; SUBCUTANEOUS at 07:48

## 2017-11-21 RX ADMIN — HEPARIN SODIUM 5000 UNITS: 5000 INJECTION, SOLUTION INTRAVENOUS; SUBCUTANEOUS at 20:51

## 2017-11-21 RX ADMIN — PANTOPRAZOLE SODIUM 40 MG: 40 INJECTION, POWDER, FOR SOLUTION INTRAVENOUS at 07:48

## 2017-11-21 RX ADMIN — SODIUM CHLORIDE 125 ML/HR: 0.9 INJECTION, SOLUTION INTRAVENOUS at 05:17

## 2017-11-21 RX ADMIN — SODIUM CHLORIDE 125 ML/HR: 0.9 INJECTION, SOLUTION INTRAVENOUS at 11:38

## 2017-11-21 NOTE — PROGRESS NOTES
Progress Note - Surgical ONcology   Beverley Asp 67 y o  male MRN: [id number]  Unit/Bed#: [facility] 607-01 Encounter: [id number]    Assessment:    71yoM s/p distal antrum and proximal duodenal resection s/p bilroth I reconstruction    -pain very well controlled  -no bowel functin  -NGT minimal output  -NAVJOT drain 130ml    Plan:    -keep epidural  -keep NGT  -continue mckenzie  -change fluids to add dextrose     Subjective/Objective     Chief Complaint:     Subjective:       Objective:     Vitals: Blood pressure 97/61, pulse 89, temperature 97 9 °F (36 6 °C), temperature source Oral, resp  rate 20, height 6' 2" (1 88 m), weight 95 3 kg (210 lb), SpO2 91 %  ,Body mass index is 26 96 kg/m²  I/O       11/19 0701 - 11/20 0700 11/20 0701 - 11/21 0700 11/21 0701 - 11/22 0700    P  O   0     I V  (mL/kg)  4296 8 (45 1)     IV Piggyback  500     Total Intake(mL/kg)  4796 8 (50 3)     Urine (mL/kg/hr)  935     Emesis/NG output  350     Drains  120     Blood  250     Total Output   1655      Net   +3141 8                   Physical Exam:     Alert  Soft, mild distension, approp tender  -incision clean and dry not draining     Lab, Imaging and other studies:   CBC with diff:   Lab Results   Component Value Date    WBC 9 92 11/21/2017    HGB 11 9 (L) 11/21/2017    HCT 34 8 (L) 11/21/2017    MCV 84 11/21/2017     11/21/2017    MCH 28 7 11/21/2017    MCHC 34 2 11/21/2017    RDW 13 6 11/21/2017    MPV 8 8 (L) 11/21/2017    NRBC 0 11/21/2017     VTE Pharmacologic Prophylaxis: Heparin  VTE Mechanical Prophylaxis: sequential compression device

## 2017-11-21 NOTE — PROGRESS NOTES
Anesthesia Progress Note - [facility]    Anai Davila MRN: [id number]  Unit/Bed#: [facility] 607-01 Encounter: [id number]        Epidural is runnin 1% Ropivacaine plus 2 ug/cc fentanyl at 7 cc/hr, bolus of 4 cc every 15 minutes prn  Patient is happy with his pain control  Plan:   Continue epidural at present settings        Assessment:   67 y o  male status post Partial Gastrectomy POD# 1  Epidural Site:clean and dry  Neurological assessment : Patient neurologically intact    Subjective:  Current pain location(s): abdomen  Pain Scale:   3/10      Meds/Allergies   current meds:   Current Facility-Administered Medications   Medication Dose Route Frequency    heparin (porcine) subcutaneous injection 5,000 Units  5,000 Units Subcutaneous Q12H [facility]    HYDROmorphone (DILAUDID) 1 mg/mL injection 0 5 mg  0 5 mg Intravenous Q1H PRN    lactated ringers infusion  20 mL/hr Intravenous Continuous    metoclopramide (REGLAN) injection 5 mg  5 mg Intravenous Q6H PRN    montelukast (SINGULAIR) tablet 10 mg  10 mg Oral HS    naloxone (NARCAN) 0 4 mg/mL injection 0 1 mg  0 1 mg Intravenous PRN    ondansetron (ZOFRAN) injection 4 mg  4 mg Intravenous Q4H PRN    pantoprazole (PROTONIX) injection 40 mg  40 mg Intravenous Q24H KALEN    ropivacaine 0 1% and fentaNYL 2 mcg/mL 250 mL PCEA   Epidural Continuous    sodium chloride 0 9 % infusion  125 mL/hr Intravenous Continuous       Allergies   Allergen Reactions    Percocet [Oxycodone-Acetaminophen]        Objective     Temp:  [97 3 °F (36 3 °C)-98 3 °F (36 8 °C)] 98 °F (36 7 °C)  HR:  [] 96  Resp:  [13-21] 18  BP: ()/(60-75) 127/70  Arterial Line BP: ()/(50-72) 98/50    SIGNATURE: Lay James MD  DATE: 2017  TIME: 1:51 PM    Please call  ( [facility] [phone]) with any questions

## 2017-11-21 NOTE — MEDICAL STUDENT
Progress Note - General Surgery   Milderd Car 67 y o  male MRN: [id number]  Unit/Bed#: [facility] 607-01 Encounter: [id number]    Assessment:  S/p exploratory laparotomy, resection of distal stomach and 1st part of duodenum    Plan:  1) continue pain control with epidural  2) dressing change this morning  3) continue NAVJOT  4) continue NPO/NGT  5) continue mckenzie    Subjective/Objective     Subjective: The patient reports that his pain is being well managed with his epidural  He had an episode of nausea last night that has since resolved without recurrence  He reports no fevers, chills, shortness of breath  He has had no flatus and has a mckenzie catheter in place  Objective:     Blood pressure 101/65, pulse 84, temperature 98 1 °F (36 7 °C), temperature source Oral, resp  rate 20, height 6' 2" (1 88 m), weight 95 3 kg (210 lb), SpO2 94 %  ,Body mass index is 26 96 kg/m²        Intake/Output Summary (Last 24 hours) at 11/21/17 0556  Last data filed at 11/21/17 0534   Gross per 24 hour   Intake           4713 6 ml   Output             1655 ml   Net           3058 6 ml       Invasive Devices     Peripheral Intravenous Line            Peripheral IV 11/20/17 Left Arm less than 1 day    Peripheral IV 11/20/17 Right Arm less than 1 day          Epidural Line            Epidural Catheter 11/20/17 less than 1 day          Drain            Closed/Suction Drain Right Abdomen Bulb 19 Fr  less than 1 day    NG/OG/Enteral Tube Nasogastric 18 Fr Right nares less than 1 day    Urethral Catheter less than 1 day                Physical Exam: General appearance: alert, appears stated age and cooperative  Lungs: clear to auscultation bilaterally  Heart: regular rate and rhythm, S1, S2 normal, no murmur, click, rub or gallop  Abdomen: abnormal findings:  absent bowel sounds and right sided NAVJOT drain, midline incision without erythema or tenderness    Lab, Imaging and other studies:  CBC:   Lab Results   Component Value Date    WBC 9 92 11/21/2017    HGB 11 9 (L) 11/21/2017    HCT 34 8 (L) 11/21/2017    MCV 84 11/21/2017     11/21/2017    MCH 28 7 11/21/2017    MCHC 34 2 11/21/2017    RDW 13 6 11/21/2017    MPV 8 8 (L) 11/21/2017    NRBC 0 11/21/2017   , CMP:   Lab Results   Component Value Date     11/21/2017    K 3 7 11/21/2017     11/21/2017    CO2 27 11/21/2017    ANIONGAP 8 11/21/2017    BUN 16 11/21/2017    CREATININE 0 72 11/21/2017    GLUCOSE 126 11/21/2017    CALCIUM 8 6 11/21/2017    EGFR 93 11/21/2017     VTE Pharmacologic Prophylaxis: Heparin  VTE Mechanical Prophylaxis: sequential compression device

## 2017-11-21 NOTE — PROGRESS NOTES
R4 Post-op check    S: Pain controlled  No nausea or vomiting       O: /60   Pulse 97   Temp 98 3 °F (36 8 °C) (Oral)   Resp 20   Ht 6' 2" (1 88 m)   Wt 95 3 kg (210 lb)   SpO2 96%   BMI 26 96 kg/m²     Abdomen: soft, NT, dressing c/d/i w/ tegaderm, NAVJOT w/ serosanguinous drainage    A/P: 67 y o  y/o male s/p exploratory laparotomy, resection distal stomach and 1st part of duodenum, bilroth 1    1) cont epidural  2) cont NAVJOT  3) dressing down in Am  4) cont mckenzie  5) cont NGT - do not manipulate    Emi Tejada MD PGY-4  11:12 PM  11/20/17

## 2017-11-21 NOTE — CASE MANAGEMENT
Initial Clinical Review    Age/Sex: 67 y o  male    Surgery Date: 11/20    Procedure: EXPLORATORY LAPARATOMY; RESECTION DISTAL STOMACH AND FIRST OF SUODENAL; BILL GONG RECOSTRUCTION  (N/A)   Omental/Maxwell patch    Operative Findings:  Large duodenal mass  This could not be removed by just dividing the base  Consequently, a duodenal resection was performed        Anesthesia: General     Admission Orders: Date/Time/Statement: 11/20/17 @ 1410     Orders Placed This Encounter   Procedures    Inpatient Admission     Standing Status:   Standing     Number of Occurrences:   1     Order Specific Question:   Admitting Physician     Answer:   Caroline Aaron     Order Specific Question:   Level of Care     Answer:   Level 2 Stepdown / HOT [14]     Order Specific Question:   Estimated length of stay     Answer:   More than 2 Midnights     Order Specific Question:   Certification     Answer:   I certify that inpatient services are medically necessary for this patient for a duration of greater than two midnights  See H&P and MD Progress Notes for additional information about the patient's course of treatment  Vital Signs: /68   Pulse 82   Temp 98 °F (36 7 °C) (Oral)   Resp 18   Ht 6' 2" (1 88 m)   Wt 95 3 kg (210 lb)   SpO2 92%   BMI 26 96 kg/m²     Scheduled Meds:  heparin (porcine) 5,000 Units Subcutaneous Q12H KALEN   montelukast 10 mg Oral HS   pantoprazole 40 mg Intravenous Q24H [address]     Continuous Infusions:  lactated ringers 20 mL/hr Last Rate: Stopped (11/20/17 1502)   ropivacaine 0 1% and fentaNYL 2 mcg/mL     sodium chloride 125 mL/hr Last Rate: 125 mL/hr (11/21/17 1138)     PRN Meds:  HYDROmorphone iv 11/20 x 2     metoclopramide    naloxone    Ondansetron iv 11/20 x 2      Nursing orders - Telem - Incentive spirometry - OOB as tolerated - Drain care q shift - NG tube to LCWS - Boudreaux cath  - elevate HOB    10 degrees - Diet NPO - PT/OT eval

## 2017-11-22 ENCOUNTER — APPOINTMENT (INPATIENT)
Dept: RADIOLOGY | Facility: HOSPITAL | Age: 72
DRG: 328 | End: 2017-11-22
Attending: SURGERY
Payer: MEDICARE

## 2017-11-22 LAB
ANION GAP SERPL CALCULATED.3IONS-SCNC: 7 MMOL/L (ref 4–13)
BASOPHILS # BLD AUTO: 0.01 THOUSANDS/ΜL (ref 0–0.1)
BASOPHILS NFR BLD AUTO: 0 % (ref 0–1)
BUN SERPL-MCNC: 14 MG/DL (ref 5–25)
CALCIUM SERPL-MCNC: 8.1 MG/DL (ref 8.3–10.1)
CHLORIDE SERPL-SCNC: 107 MMOL/L (ref 100–108)
CO2 SERPL-SCNC: 27 MMOL/L (ref 21–32)
CREAT SERPL-MCNC: 0.57 MG/DL (ref 0.6–1.3)
EOSINOPHIL # BLD AUTO: 0.02 THOUSAND/ΜL (ref 0–0.61)
EOSINOPHIL NFR BLD AUTO: 0 % (ref 0–6)
ERYTHROCYTE [DISTWIDTH] IN BLOOD BY AUTOMATED COUNT: 13.6 % (ref 11.6–15.1)
GFR SERPL CREATININE-BSD FRML MDRD: 103 ML/MIN/1.73SQ M
GLUCOSE SERPL-MCNC: 107 MG/DL (ref 65–140)
HCT VFR BLD AUTO: 33 % (ref 36.5–49.3)
HGB BLD-MCNC: 11.2 G/DL (ref 12–17)
LYMPHOCYTES # BLD AUTO: 0.74 THOUSANDS/ΜL (ref 0.6–4.47)
LYMPHOCYTES NFR BLD AUTO: 11 % (ref 14–44)
MAGNESIUM SERPL-MCNC: 2.4 MG/DL (ref 1.6–2.6)
MCH RBC QN AUTO: 29 PG (ref 26.8–34.3)
MCHC RBC AUTO-ENTMCNC: 33.9 G/DL (ref 31.4–37.4)
MCV RBC AUTO: 86 FL (ref 82–98)
MONOCYTES # BLD AUTO: 0.68 THOUSAND/ΜL (ref 0.17–1.22)
MONOCYTES NFR BLD AUTO: 10 % (ref 4–12)
NEUTROPHILS # BLD AUTO: 5.06 THOUSANDS/ΜL (ref 1.85–7.62)
NEUTS SEG NFR BLD AUTO: 79 % (ref 43–75)
NRBC BLD AUTO-RTO: 0 /100 WBCS
PLATELET # BLD AUTO: 162 THOUSANDS/UL (ref 149–390)
PMV BLD AUTO: 9.1 FL (ref 8.9–12.7)
POTASSIUM SERPL-SCNC: 3.8 MMOL/L (ref 3.5–5.3)
RBC # BLD AUTO: 3.86 MILLION/UL (ref 3.88–5.62)
SODIUM SERPL-SCNC: 141 MMOL/L (ref 136–145)
WBC # BLD AUTO: 6.54 THOUSAND/UL (ref 4.31–10.16)

## 2017-11-22 PROCEDURE — 74000 HB X-RAY EXAM OF ABDOMEN (SINGLE ANTEROPOSTERIOR VIEW): CPT

## 2017-11-22 PROCEDURE — 85025 COMPLETE CBC W/AUTO DIFF WBC: CPT | Performed by: SURGERY

## 2017-11-22 PROCEDURE — 94760 N-INVAS EAR/PLS OXIMETRY 1: CPT

## 2017-11-22 PROCEDURE — C9113 INJ PANTOPRAZOLE SODIUM, VIA: HCPCS | Performed by: SURGERY

## 2017-11-22 PROCEDURE — 80048 BASIC METABOLIC PNL TOTAL CA: CPT | Performed by: SURGERY

## 2017-11-22 PROCEDURE — 83735 ASSAY OF MAGNESIUM: CPT | Performed by: SURGERY

## 2017-11-22 RX ORDER — DEXTROSE, SODIUM CHLORIDE, AND POTASSIUM CHLORIDE 5; .45; .15 G/100ML; G/100ML; G/100ML
50 INJECTION INTRAVENOUS CONTINUOUS
Status: DISCONTINUED | OUTPATIENT
Start: 2017-11-22 | End: 2017-11-24

## 2017-11-22 RX ADMIN — HEPARIN SODIUM 5000 UNITS: 5000 INJECTION, SOLUTION INTRAVENOUS; SUBCUTANEOUS at 21:26

## 2017-11-22 RX ADMIN — HEPARIN SODIUM 5000 UNITS: 5000 INJECTION, SOLUTION INTRAVENOUS; SUBCUTANEOUS at 09:43

## 2017-11-22 RX ADMIN — DEXTROSE, SODIUM CHLORIDE, AND POTASSIUM CHLORIDE 100 ML/HR: 5; .45; .15 INJECTION INTRAVENOUS at 09:46

## 2017-11-22 RX ADMIN — DEXTROSE, SODIUM CHLORIDE, AND POTASSIUM CHLORIDE 100 ML/HR: 5; .45; .15 INJECTION INTRAVENOUS at 19:32

## 2017-11-22 RX ADMIN — Medication: at 18:24

## 2017-11-22 RX ADMIN — PANTOPRAZOLE SODIUM 40 MG: 40 INJECTION, POWDER, FOR SOLUTION INTRAVENOUS at 09:44

## 2017-11-22 RX ADMIN — SODIUM CHLORIDE 125 ML/HR: 0.9 INJECTION, SOLUTION INTRAVENOUS at 03:42

## 2017-11-22 NOTE — PROGRESS NOTES
While patient was bathing, the securing sticker on his NG tube came off his nose  Tube moved out about 6 inches  Was called to room by anesthesia MD  Per Alycia Cook (white surg), do not advance or move NG tube- resecured at current site

## 2017-11-22 NOTE — PROGRESS NOTES
Progress Note - Surgical ONcology   Allison De La Garza 67 y o  male MRN: [id number]  Unit/Bed#: [facility] 607-01 Encounter: [id number]    Assessment:    71yoM s/p distal antrum and proximal duodenal resection s/p bilroth I reconstruction    Pain controlled  410 from NGT  SS output from drain    Plan:    -keep epidural  -keep NGT  -D/c mckenzie  -IVF to 76    Subjective/Objective     Chief Complaint: Den ies complaint, doing well, no flatus or BM  Subjective:       Objective:     Vitals: Blood pressure 136/79, pulse 84, temperature 97 7 °F (36 5 °C), temperature source Oral, resp  rate 18, height 6' 2" (1 88 m), weight 95 3 kg (210 lb), SpO2 94 %  ,Body mass index is 26 96 kg/m²  I/O       11/19 0701 - 11/20 0700 11/20 0701 - 11/21 0700 11/21 0701 - 11/22 0700    P  O   0     I V  (mL/kg)  4296 8 (45 1)     IV Piggyback  500     Total Intake(mL/kg)  4796 8 (50 3)     Urine (mL/kg/hr)  935     Emesis/NG output  350     Drains  120     Blood  250     Total Output   1655      Net   +3141 8                   Physical Exam:     Alert  Soft, mild distension, approp tender  -incision clean and dry not draining     Lab, Imaging and other studies:   CBC with diff:   Lab Results   Component Value Date    WBC 6 54 11/22/2017    HGB 11 2 (L) 11/22/2017    HCT 33 0 (L) 11/22/2017    MCV 86 11/22/2017     11/22/2017    MCH 29 0 11/22/2017    MCHC 33 9 11/22/2017    RDW 13 6 11/22/2017    MPV 9 1 11/22/2017    NRBC 0 11/22/2017     VTE Pharmacologic Prophylaxis: Heparin  VTE Mechanical Prophylaxis: sequential compression device

## 2017-11-22 NOTE — PROGRESS NOTES
Patient's nasogastric tube inadvertently began to come out  It was retaped at where he was holding it  A KUB revealed the distal tip of NGT in distal esophagus  Dr Neal Moore made aware  Nasogastric tube advanced approx 4 inches without resistance or difficulty  Retaped tightly to nose

## 2017-11-22 NOTE — PROGRESS NOTES
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]    Assessment:   67 y o  male status post Procedure(s):  EXPLORATORY LAPARATOMY; RESECTION DISTAL STOMACH AND FIRST OF SUODENAL; BILL GONG RECOSTRUCTION  POD# 2    Principal Problem:    Duodenal mass    Plan:   1  Continue current analgesic regimen as prescribed  2   Asked nursing team to contact surgical team regarding dislodged NG tube    APS will continue to follow; please call  ( [facility] [phone]) with any questions    Pain Course:  Current pain location(s):  Abdomen  Pain Scale:   1 to 2/10  24 hour history:  Patient states he has had satisfactory analgesia  With ambulation he is reports a pain score of 2/10  At rest he has a pain of 0 to 1/10      Meds/Allergies   all current active meds have been reviewed and current meds:   Current Facility-Administered Medications   Medication Dose Route Frequency    dextrose 5 % and sodium chloride 0 45 % with KCl 20 mEq/L infusion  100 mL/hr Intravenous Continuous    heparin (porcine) subcutaneous injection 5,000 Units  5,000 Units Subcutaneous Q12H [address]    HYDROmorphone (DILAUDID) 1 mg/mL injection 0 5 mg  0 5 mg Intravenous Q1H PRN    lactated ringers infusion  20 mL/hr Intravenous Continuous    metoclopramide (REGLAN) injection 5 mg  5 mg Intravenous Q6H PRN    montelukast (SINGULAIR) tablet 10 mg  10 mg Oral HS    naloxone (NARCAN) 0 4 mg/mL injection 0 1 mg  0 1 mg Intravenous PRN    ondansetron (ZOFRAN) injection 4 mg  4 mg Intravenous Q4H PRN    pantoprazole (PROTONIX) injection 40 mg  40 mg Intravenous Q24H KALEN    ropivacaine 0 1% and fentaNYL 2 mcg/mL 250 mL PCEA   Epidural Continuous       Allergies   Allergen Reactions    Percocet [Oxycodone-Acetaminophen]        Objective     Physical Exam: /78 Pulse 85   Temp 97 9 °F (36 6 °C) (Oral)   Resp 20   Ht 6' 2" (1 88 m)   Wt 95 3 kg (210 lb)   SpO2 94%   BMI 26 96 kg/m²   Gen: Well appearing and well nourished, NAD  Skin: Warm, Dry   HEENT:  PERRLA, EOMI  NGT in right naris has been withdrawn approximately 10 cm    Pul: Lungs CTAB  Abd: soft mildly distended, nontender  Ext:  No cyanosis or edema  Neuro: AAOx3; CN II-XII grossly intact; no gross focal neurologic deficits Lower extremities with intact strength and sensation    Labs:  CBC - WBC/Hgb/Hct/Plts: 6 54/11 2*/33 0*/162 (11/22 0443)  CHEM - BUN/Cr/glu/ALT/AST/amyl/lip:14/0 57*/--/--/--/--/-- (11/22 0443)     LYTES - Na/K/Cl/CO2: 141/3 8/107/27 (11/22 4223)    SIGNATURE: Ana Paula Maldonado DO  DATE: November 22, 2017  TIME: 9:17 AM

## 2017-11-22 NOTE — SOCIAL WORK
Met with pt and discussed role of CM  Pt currently living with his wife in a 2-story home with 5 steps at entrance in [place], [place]  Pt is independent in ADLs  Pt denies having any DME or prior HHC  Preference for pharmacy is [facility] in [place], [place]  Pt denies having hx MH/D&A tx  CM reviewed d/c planning process including the following: identifying help at home, patient preference for d/c planning needs, Discharge Lounge, [facility] Meds to Bed program, availability of treatment team to discuss questions or concerns patient and/or family may have regarding understanding medications and recognizing signs and symptoms once discharged  CM also encouraged patient to follow up with all recommended appointments after discharge  Patient advised of importance for patient and family to participate in managing patients medical well being  Main contact: Hayden Everett ([phone])

## 2017-11-23 LAB
ABO GROUP BLD BPU: NORMAL
ABO GROUP BLD BPU: NORMAL
ANION GAP SERPL CALCULATED.3IONS-SCNC: 6 MMOL/L (ref 4–13)
BASOPHILS # BLD AUTO: 0.01 THOUSANDS/ΜL (ref 0–0.1)
BASOPHILS NFR BLD AUTO: 0 % (ref 0–1)
BPU ID: NORMAL
BPU ID: NORMAL
BUN SERPL-MCNC: 9 MG/DL (ref 5–25)
CALCIUM SERPL-MCNC: 8.5 MG/DL (ref 8.3–10.1)
CHLORIDE SERPL-SCNC: 105 MMOL/L (ref 100–108)
CO2 SERPL-SCNC: 27 MMOL/L (ref 21–32)
CREAT SERPL-MCNC: 0.59 MG/DL (ref 0.6–1.3)
CROSSMATCH: NORMAL
CROSSMATCH: NORMAL
EOSINOPHIL # BLD AUTO: 0.02 THOUSAND/ΜL (ref 0–0.61)
EOSINOPHIL NFR BLD AUTO: 0 % (ref 0–6)
ERYTHROCYTE [DISTWIDTH] IN BLOOD BY AUTOMATED COUNT: 12.9 % (ref 11.6–15.1)
GFR SERPL CREATININE-BSD FRML MDRD: 101 ML/MIN/1.73SQ M
GLUCOSE SERPL-MCNC: 130 MG/DL (ref 65–140)
HCT VFR BLD AUTO: 33.6 % (ref 36.5–49.3)
HGB BLD-MCNC: 11.4 G/DL (ref 12–17)
LYMPHOCYTES # BLD AUTO: 0.69 THOUSANDS/ΜL (ref 0.6–4.47)
LYMPHOCYTES NFR BLD AUTO: 14 % (ref 14–44)
MCH RBC QN AUTO: 28.7 PG (ref 26.8–34.3)
MCHC RBC AUTO-ENTMCNC: 33.9 G/DL (ref 31.4–37.4)
MCV RBC AUTO: 85 FL (ref 82–98)
MONOCYTES # BLD AUTO: 0.68 THOUSAND/ΜL (ref 0.17–1.22)
MONOCYTES NFR BLD AUTO: 14 % (ref 4–12)
NEUTROPHILS # BLD AUTO: 3.57 THOUSANDS/ΜL (ref 1.85–7.62)
NEUTS SEG NFR BLD AUTO: 72 % (ref 43–75)
NRBC BLD AUTO-RTO: 0 /100 WBCS
PLATELET # BLD AUTO: 146 THOUSANDS/UL (ref 149–390)
PMV BLD AUTO: 8.5 FL (ref 8.9–12.7)
POTASSIUM SERPL-SCNC: 3.5 MMOL/L (ref 3.5–5.3)
RBC # BLD AUTO: 3.97 MILLION/UL (ref 3.88–5.62)
SODIUM SERPL-SCNC: 138 MMOL/L (ref 136–145)
UNIT DISPENSE STATUS: NORMAL
UNIT DISPENSE STATUS: NORMAL
UNIT PRODUCT CODE: NORMAL
UNIT PRODUCT CODE: NORMAL
UNIT RH: NORMAL
UNIT RH: NORMAL
WBC # BLD AUTO: 4.98 THOUSAND/UL (ref 4.31–10.16)

## 2017-11-23 PROCEDURE — C9113 INJ PANTOPRAZOLE SODIUM, VIA: HCPCS | Performed by: SURGERY

## 2017-11-23 PROCEDURE — 85025 COMPLETE CBC W/AUTO DIFF WBC: CPT | Performed by: SURGERY

## 2017-11-23 PROCEDURE — 80048 BASIC METABOLIC PNL TOTAL CA: CPT | Performed by: SURGERY

## 2017-11-23 RX ORDER — POTASSIUM CHLORIDE 14.9 MG/ML
20 INJECTION INTRAVENOUS ONCE
Status: COMPLETED | OUTPATIENT
Start: 2017-11-23 | End: 2017-11-23

## 2017-11-23 RX ORDER — POTASSIUM CHLORIDE 14.9 MG/ML
20 INJECTION INTRAVENOUS ONCE
Status: COMPLETED | OUTPATIENT
Start: 2017-11-23 | End: 2017-11-24

## 2017-11-23 RX ADMIN — POTASSIUM CHLORIDE 20 MEQ: 200 INJECTION, SOLUTION INTRAVENOUS at 09:03

## 2017-11-23 RX ADMIN — DEXTROSE, SODIUM CHLORIDE, AND POTASSIUM CHLORIDE 100 ML/HR: 5; .45; .15 INJECTION INTRAVENOUS at 05:09

## 2017-11-23 RX ADMIN — HEPARIN SODIUM 5000 UNITS: 5000 INJECTION, SOLUTION INTRAVENOUS; SUBCUTANEOUS at 09:03

## 2017-11-23 RX ADMIN — POTASSIUM CHLORIDE 20 MEQ: 200 INJECTION, SOLUTION INTRAVENOUS at 14:15

## 2017-11-23 RX ADMIN — HEPARIN SODIUM 5000 UNITS: 5000 INJECTION, SOLUTION INTRAVENOUS; SUBCUTANEOUS at 20:52

## 2017-11-23 RX ADMIN — DEXTROSE, SODIUM CHLORIDE, AND POTASSIUM CHLORIDE 50 ML/HR: 5; .45; .15 INJECTION INTRAVENOUS at 19:09

## 2017-11-23 RX ADMIN — Medication: at 19:11

## 2017-11-23 RX ADMIN — POTASSIUM CHLORIDE 20 MEQ: 200 INJECTION, SOLUTION INTRAVENOUS at 12:01

## 2017-11-23 RX ADMIN — PANTOPRAZOLE SODIUM 40 MG: 40 INJECTION, POWDER, FOR SOLUTION INTRAVENOUS at 09:03

## 2017-11-23 NOTE — PROGRESS NOTES
Progress Note - General Surgery   Alexandria Ramirez 67 y o  male MRN: [id number]  Unit/Bed#: [facility] 607-01 Encounter: [id number]    Assessment:  66 yo M w duodenal polyp s/p exploratory laparotomyk, resection of distal stomach and 1st portion of duodenum w BR1 reconstruction     - pain improved   No flatus or BM yet   - NGT output 600  - pain minimal     Plan: Will discuss DC NGT, if able to remove will leave npo sips   Continue epidural   Continue emil   Continue ivf  Encourage ambulation     Subjective/Objective   Chief Complaint:     Subjective: no overnight events  Feeling improved  No nausea/vomiting  No shortness of breath or chest pain     Objective:     Blood pressure 142/91, pulse 82, temperature 99 8 °F (37 7 °C), temperature source Oral, resp  rate 20, height 6' 2" (1 88 m), weight 95 3 kg (210 lb), SpO2 96 %  ,Body mass index is 26 96 kg/m²  Intake/Output Summary (Last 24 hours) at 11/23/17 0559  Last data filed at 11/23/17 0517   Gross per 24 hour   Intake          1341 77 ml   Output             3890 ml   Net         -2548 23 ml       Invasive Devices     Peripheral Intravenous Line            Peripheral IV 11/20/17 Left Arm 2 days    Peripheral IV 11/20/17 Right Arm 2 days          Epidural Line            Epidural Catheter 11/20/17 2 days          Drain            Closed/Suction Drain Right Abdomen Bulb 19 Fr  2 days    NG/OG/Enteral Tube Nasogastric 18 Fr Right nares 2 days                Physical Exam: General: AAOx3  Respiratory: BS b/l  Abdomen: Soft, NT, no rebound/guarding  Incision c/d/i  Emil serosang  Heart: RRR, S1s2  Ext: Warm no cyanosis       Lab, Imaging and other studies:I have personally reviewed pertinent lab results    , CBC: No results found for: WBC, HGB, HCT, MCV, PLT, ADJUSTEDWBC, MCH, MCHC, RDW, MPV, NRBC, CMP: No results found for: NA, K, CL, CO2, ANIONGAP, BUN, CREATININE, GLUCOSE, CALCIUM, AST, ALT, ALKPHOS, PROT, ALBUMIN, BILITOT, EGFR  VTE Pharmacologic Prophylaxis: Sequential compression device (Venodyne)   VTE Mechanical Prophylaxis: sequential compression device

## 2017-11-23 NOTE — PROGRESS NOTES
Pt resting comfortably with epidural in place  No complaints       Continue epidural at current settings    Sasha Gasca MD

## 2017-11-24 LAB
ANION GAP SERPL CALCULATED.3IONS-SCNC: 6 MMOL/L (ref 4–13)
BASOPHILS # BLD AUTO: 0.02 THOUSANDS/ΜL (ref 0–0.1)
BASOPHILS NFR BLD AUTO: 0 % (ref 0–1)
BUN SERPL-MCNC: 13 MG/DL (ref 5–25)
CALCIUM SERPL-MCNC: 8.5 MG/DL (ref 8.3–10.1)
CHLORIDE SERPL-SCNC: 104 MMOL/L (ref 100–108)
CO2 SERPL-SCNC: 27 MMOL/L (ref 21–32)
CREAT SERPL-MCNC: 0.56 MG/DL (ref 0.6–1.3)
EOSINOPHIL # BLD AUTO: 0.03 THOUSAND/ΜL (ref 0–0.61)
EOSINOPHIL NFR BLD AUTO: 1 % (ref 0–6)
ERYTHROCYTE [DISTWIDTH] IN BLOOD BY AUTOMATED COUNT: 12.9 % (ref 11.6–15.1)
GFR SERPL CREATININE-BSD FRML MDRD: 103 ML/MIN/1.73SQ M
GLUCOSE SERPL-MCNC: 107 MG/DL (ref 65–140)
HCT VFR BLD AUTO: 33.4 % (ref 36.5–49.3)
HGB BLD-MCNC: 11.4 G/DL (ref 12–17)
LYMPHOCYTES # BLD AUTO: 0.81 THOUSANDS/ΜL (ref 0.6–4.47)
LYMPHOCYTES NFR BLD AUTO: 17 % (ref 14–44)
MAGNESIUM SERPL-MCNC: 2.3 MG/DL (ref 1.6–2.6)
MCH RBC QN AUTO: 28.9 PG (ref 26.8–34.3)
MCHC RBC AUTO-ENTMCNC: 34.1 G/DL (ref 31.4–37.4)
MCV RBC AUTO: 85 FL (ref 82–98)
MONOCYTES # BLD AUTO: 0.71 THOUSAND/ΜL (ref 0.17–1.22)
MONOCYTES NFR BLD AUTO: 15 % (ref 4–12)
NEUTROPHILS # BLD AUTO: 3.16 THOUSANDS/ΜL (ref 1.85–7.62)
NEUTS SEG NFR BLD AUTO: 67 % (ref 43–75)
NRBC BLD AUTO-RTO: 0 /100 WBCS
PHOSPHATE SERPL-MCNC: 3.1 MG/DL (ref 2.3–4.1)
PLATELET # BLD AUTO: 156 THOUSANDS/UL (ref 149–390)
PMV BLD AUTO: 8.7 FL (ref 8.9–12.7)
POTASSIUM SERPL-SCNC: 3.6 MMOL/L (ref 3.5–5.3)
RBC # BLD AUTO: 3.94 MILLION/UL (ref 3.88–5.62)
SODIUM SERPL-SCNC: 137 MMOL/L (ref 136–145)
WBC # BLD AUTO: 4.74 THOUSAND/UL (ref 4.31–10.16)

## 2017-11-24 PROCEDURE — 85025 COMPLETE CBC W/AUTO DIFF WBC: CPT | Performed by: STUDENT IN AN ORGANIZED HEALTH CARE EDUCATION/TRAINING PROGRAM

## 2017-11-24 PROCEDURE — C9113 INJ PANTOPRAZOLE SODIUM, VIA: HCPCS | Performed by: SURGERY

## 2017-11-24 PROCEDURE — 84100 ASSAY OF PHOSPHORUS: CPT | Performed by: STUDENT IN AN ORGANIZED HEALTH CARE EDUCATION/TRAINING PROGRAM

## 2017-11-24 PROCEDURE — 80048 BASIC METABOLIC PNL TOTAL CA: CPT | Performed by: STUDENT IN AN ORGANIZED HEALTH CARE EDUCATION/TRAINING PROGRAM

## 2017-11-24 PROCEDURE — 83735 ASSAY OF MAGNESIUM: CPT | Performed by: STUDENT IN AN ORGANIZED HEALTH CARE EDUCATION/TRAINING PROGRAM

## 2017-11-24 RX ORDER — POTASSIUM CHLORIDE 14.9 MG/ML
20 INJECTION INTRAVENOUS ONCE
Status: COMPLETED | OUTPATIENT
Start: 2017-11-24 | End: 2017-11-24

## 2017-11-24 RX ORDER — DEXTROSE, SODIUM CHLORIDE, AND POTASSIUM CHLORIDE 5; .45; .15 G/100ML; G/100ML; G/100ML
50 INJECTION INTRAVENOUS CONTINUOUS
Status: DISCONTINUED | OUTPATIENT
Start: 2017-11-24 | End: 2017-11-26

## 2017-11-24 RX ADMIN — HEPARIN SODIUM 5000 UNITS: 5000 INJECTION, SOLUTION INTRAVENOUS; SUBCUTANEOUS at 08:50

## 2017-11-24 RX ADMIN — HEPARIN SODIUM 5000 UNITS: 5000 INJECTION, SOLUTION INTRAVENOUS; SUBCUTANEOUS at 21:58

## 2017-11-24 RX ADMIN — DEXTROSE, SODIUM CHLORIDE, AND POTASSIUM CHLORIDE 60 ML/HR: 5; .45; .15 INJECTION INTRAVENOUS at 13:38

## 2017-11-24 RX ADMIN — POTASSIUM CHLORIDE 20 MEQ: 200 INJECTION, SOLUTION INTRAVENOUS at 10:01

## 2017-11-24 RX ADMIN — PANTOPRAZOLE SODIUM 40 MG: 40 INJECTION, POWDER, FOR SOLUTION INTRAVENOUS at 08:50

## 2017-11-24 RX ADMIN — Medication: at 17:24

## 2017-11-24 NOTE — OCCUPATIONAL THERAPY NOTE
Occupational Therapy Screen        Patient Name: Raquel Edward  Today's Date: 11/24/2017    Pt screened for OT services, pt walking the halls w/ IV pole and in completing self-care in bathroom independently  Pt w/ no inpt acute OT needs warranted at this time       Jhonathan Holcomb MS, OTR/L

## 2017-11-24 NOTE — PROGRESS NOTES
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]    Assessment:   67 y o  male status post Procedure(s):  EXPLORATORY LAPARATOMY; RESECTION DISTAL STOMACH AND FIRST OF SUODENAL; BILL BELTRAN RECOSTRUCTION  POD# 4    Principal Problem:    Duodenal mass      Plan:   1  Continue epidural for next 24 hours  Plan to remove epidural tomorrow  Please hold AM dose of SubQ Heparin  2   Discussed with Surgery Team      APS will continue to follow; please call  ( [phone]) with any questions    Pain Course:  Current pain location(s): Abdomen  Pain Scale:   1-3/10  Comfortable  Worse with exertion but tolerating walking in hallways  24 hour history:  Patient states satisfied with analgesic regimen      Meds/Allergies   all current active meds have been reviewed and current meds:   Current Facility-Administered Medications   Medication Dose Route Frequency    dextrose 5 % and sodium chloride 0 45 % with KCl 20 mEq/L infusion  50 mL/hr Intravenous Continuous    heparin (porcine) subcutaneous injection 5,000 Units  5,000 Units Subcutaneous Q12H [address]    HYDROmorphone (DILAUDID) 1 mg/mL injection 0 5 mg  0 5 mg Intravenous Q1H PRN    metoclopramide (REGLAN) injection 5 mg  5 mg Intravenous Q6H PRN    montelukast (SINGULAIR) tablet 10 mg  10 mg Oral HS    naloxone (NARCAN) 0 4 mg/mL injection 0 1 mg  0 1 mg Intravenous PRN    ondansetron (ZOFRAN) injection 4 mg  4 mg Intravenous Q4H PRN    pantoprazole (PROTONIX) injection 40 mg  40 mg Intravenous Q24H KALEN    ropivacaine 0 1% and fentaNYL 2 mcg/mL 250 mL PCEA   Epidural Continuous       Allergies   Allergen Reactions    Percocet [Oxycodone-Acetaminophen]        Objective     Physical Exam: /66   Pulse 84   Temp 99 9 °F (37 7 °C) (Oral)   Resp 20   Ht 6' 2" (1 88 m)   Wt 95 3 kg (210 lb)   SpO2 95%   BMI 26 96 kg/m²   Gen: Well appearing and well nourished, NAD  Skin: Warm, Dry   HEENT:  PERRLA, EOMI  Pul: Lungs CTAB  Abd: soft nontender, mildly distended  Ext:  No cyanosis or edema  Neuro: AAOx3; CN II-XII grossly intact; no gross focal neurologic deficits  Epidural site without erythema or tenderness       Labs:  CBC - WBC/Hgb/Hct/Plts: 4 74/11 4*/33 4*/156 (11/24 9407)  CHEM - BUN/Cr/glu/ALT/AST/amyl/lip:13/0 56*/--/--/--/--/-- (11/24 5471)     LYTES - Na/K/Cl/CO2: 137/3 6/104/27 (11/24 2029)    SIGNATURE: Sammy Reynolds DO  DATE: November 24, 2017  TIME: 8:25 AM

## 2017-11-24 NOTE — PROGRESS NOTES
Progress Note - General Surgery   Cayla Few 67 y o  male MRN: [id number]  Unit/Bed#: [facility] 607-01 Encounter: [id number]    Assessment:  66 yo M w duodenal polyp s/p exploratory laparotomyk, resection of distal stomach and 1st portion of duodenum w BR1 reconstruction   - NGT output 300    Plan:  - NPO  - NGT until 5d total or bowel function  - IVF  - D/C epidural tomorrow, will hold SQH  - NAVJOT to suction    Subjective/Objective   Chief Complaint:     Subjective: Doing well, no flatus or BM yet    Objective:     Blood pressure 135/75, pulse 97, temperature 99 2 °F (37 3 °C), resp  rate 19, height 6' 2" (1 88 m), weight 95 3 kg (210 lb), SpO2 95 %  ,Body mass index is 26 96 kg/m²  Intake/Output Summary (Last 24 hours) at 11/24/17 1125  Last data filed at 11/24/17 6078   Gross per 24 hour   Intake          2475 85 ml   Output             2695 ml   Net          -219 15 ml       Invasive Devices     Peripheral Intravenous Line            Peripheral IV 11/20/17 Left Arm 3 days    Peripheral IV 11/20/17 Right Arm 3 days          Epidural Line            Epidural Catheter 11/20/17 3 days          Drain            Closed/Suction Drain Right Abdomen Bulb 19 Fr  3 days    NG/OG/Enteral Tube Nasogastric 18 Fr Right nares 3 days                Physical Exam: General:   NAD  Norm resp effort  RRR  Abd soft, NT/ND, NAVJOT in place serosang, incision cdi      Lab, Imaging and other studies:I have personally reviewed pertinent lab results    , CBC: No results found for: WBC, HGB, HCT, MCV, PLT, ADJUSTEDWBC, MCH, MCHC, RDW, MPV, NRBC, CMP: No results found for: NA, K, CL, CO2, ANIONGAP, BUN, CREATININE, GLUCOSE, CALCIUM, AST, ALT, ALKPHOS, PROT, ALBUMIN, BILITOT, EGFR  VTE Pharmacologic Prophylaxis: Sequential compression device (Venodyne)   VTE Mechanical Prophylaxis: sequential compression device

## 2017-11-24 NOTE — CASE MANAGEMENT
Continued Stay Review    Date: 11/24/2017    Vital Signs: /76   Pulse 89   Temp 98 9 °F (37 2 °C) (Oral)   Resp 20   Ht 6' 2" (1 88 m)   Wt 95 3 kg (210 lb)   SpO2 97%   BMI 26 96 kg/m²     Medications:   Scheduled Meds:   heparin (porcine) 5,000 Units Subcutaneous Q12H KALEN   montelukast 10 mg Oral HS   pantoprazole 40 mg Intravenous Q24H [address]     Continuous Infusions:   dextrose 5 % and sodium chloride 0 45 % with KCl 20 mEq/L 60 mL/hr Last Rate: 60 mL/hr (11/24/17 1338)   ropivacaine 0 1% and fentaNYL 2 mcg/mL       PRN Meds: HYDROmorphone    metoclopramide    naloxone    ondansetron    Age/Sex: 67 y o  male     Assessment/Plan:   Assessment:  66 yo M w duodenal polyp s/p exploratory laparotomy, resection of distal stomach and 1st portion of duodenum w BR1 reconstruction   - NGT output 300     Plan:  - NPO  - NGT until 5d total or bowel function  - IVF  - D/C epidural tomorrow, will hold [facility]  - NAVJOT to suction        Discharge Plan:  Home with family

## 2017-11-24 NOTE — PLAN OF CARE

## 2017-11-24 NOTE — PHYSICAL THERAPY NOTE
PT SCREEN    PT CONSULT RECEIVED  CHART REVIEW PERFORMED  Pt AMBULATING (I) WITH SPOUSE AND WAS OBSERVED UPON ARRIVAL WASHING SELF IN BATHROOM  SPOKE WITH WIFE AND PATIENT IN REGARDS TO SAFE D/C PLANNING  NO NEEDS IDENTIFIED  NO SKILLED PT SERVICES REQUIRED  WILL SIGNOFF      Rubia Smith, PT

## 2017-11-25 LAB
ANION GAP SERPL CALCULATED.3IONS-SCNC: 7 MMOL/L (ref 4–13)
BUN SERPL-MCNC: 16 MG/DL (ref 5–25)
CALCIUM SERPL-MCNC: 8.5 MG/DL (ref 8.3–10.1)
CHLORIDE SERPL-SCNC: 105 MMOL/L (ref 100–108)
CO2 SERPL-SCNC: 26 MMOL/L (ref 21–32)
CREAT SERPL-MCNC: 0.5 MG/DL (ref 0.6–1.3)
ERYTHROCYTE [DISTWIDTH] IN BLOOD BY AUTOMATED COUNT: 12.9 % (ref 11.6–15.1)
GFR SERPL CREATININE-BSD FRML MDRD: 108 ML/MIN/1.73SQ M
GLUCOSE SERPL-MCNC: 81 MG/DL (ref 65–140)
HCT VFR BLD AUTO: 31.8 % (ref 36.5–49.3)
HGB BLD-MCNC: 10.7 G/DL (ref 12–17)
INR PPP: 1.16 (ref 0.86–1.16)
MCH RBC QN AUTO: 28.5 PG (ref 26.8–34.3)
MCHC RBC AUTO-ENTMCNC: 33.6 G/DL (ref 31.4–37.4)
MCV RBC AUTO: 85 FL (ref 82–98)
PLATELET # BLD AUTO: 170 THOUSANDS/UL (ref 149–390)
PMV BLD AUTO: 8.7 FL (ref 8.9–12.7)
POTASSIUM SERPL-SCNC: 3.5 MMOL/L (ref 3.5–5.3)
PROTHROMBIN TIME: 14.9 SECONDS (ref 12.1–14.4)
RBC # BLD AUTO: 3.75 MILLION/UL (ref 3.88–5.62)
SODIUM SERPL-SCNC: 138 MMOL/L (ref 136–145)
WBC # BLD AUTO: 3.86 THOUSAND/UL (ref 4.31–10.16)

## 2017-11-25 PROCEDURE — 85610 PROTHROMBIN TIME: CPT | Performed by: PHYSICIAN ASSISTANT

## 2017-11-25 PROCEDURE — 80048 BASIC METABOLIC PNL TOTAL CA: CPT | Performed by: PHYSICIAN ASSISTANT

## 2017-11-25 PROCEDURE — 85027 COMPLETE CBC AUTOMATED: CPT | Performed by: PHYSICIAN ASSISTANT

## 2017-11-25 PROCEDURE — C9113 INJ PANTOPRAZOLE SODIUM, VIA: HCPCS | Performed by: SURGERY

## 2017-11-25 RX ORDER — ONDANSETRON 2 MG/ML
4 INJECTION INTRAMUSCULAR; INTRAVENOUS EVERY 4 HOURS PRN
Status: DISCONTINUED | OUTPATIENT
Start: 2017-11-25 | End: 2017-11-25 | Stop reason: SDUPTHER

## 2017-11-25 RX ORDER — HEPARIN SODIUM 5000 [USP'U]/ML
5000 INJECTION, SOLUTION INTRAVENOUS; SUBCUTANEOUS EVERY 8 HOURS SCHEDULED
Status: DISCONTINUED | OUTPATIENT
Start: 2017-11-25 | End: 2017-11-27 | Stop reason: HOSPADM

## 2017-11-25 RX ORDER — OXYCODONE HYDROCHLORIDE 10 MG/1
10 TABLET ORAL EVERY 4 HOURS PRN
Status: DISCONTINUED | OUTPATIENT
Start: 2017-11-25 | End: 2017-11-27 | Stop reason: HOSPADM

## 2017-11-25 RX ORDER — OXYCODONE HYDROCHLORIDE 5 MG/1
5 TABLET ORAL EVERY 4 HOURS PRN
Status: DISCONTINUED | OUTPATIENT
Start: 2017-11-25 | End: 2017-11-27 | Stop reason: HOSPADM

## 2017-11-25 RX ORDER — POTASSIUM CHLORIDE 14.9 MG/ML
20 INJECTION INTRAVENOUS
Status: COMPLETED | OUTPATIENT
Start: 2017-11-25 | End: 2017-11-25

## 2017-11-25 RX ADMIN — OXYCODONE HYDROCHLORIDE 10 MG: 10 TABLET ORAL at 21:28

## 2017-11-25 RX ADMIN — POTASSIUM CHLORIDE 20 MEQ: 200 INJECTION, SOLUTION INTRAVENOUS at 10:42

## 2017-11-25 RX ADMIN — HEPARIN SODIUM 5000 UNITS: 5000 INJECTION, SOLUTION INTRAVENOUS; SUBCUTANEOUS at 21:28

## 2017-11-25 RX ADMIN — OXYCODONE HYDROCHLORIDE 5 MG: 5 TABLET ORAL at 13:45

## 2017-11-25 RX ADMIN — OXYCODONE HYDROCHLORIDE 5 MG: 5 TABLET ORAL at 17:10

## 2017-11-25 RX ADMIN — OXYCODONE HYDROCHLORIDE 5 MG: 5 TABLET ORAL at 10:41

## 2017-11-25 RX ADMIN — PANTOPRAZOLE SODIUM 40 MG: 40 INJECTION, POWDER, FOR SOLUTION INTRAVENOUS at 08:11

## 2017-11-25 RX ADMIN — DEXTROSE, SODIUM CHLORIDE, AND POTASSIUM CHLORIDE 60 ML/HR: 5; .45; .15 INJECTION INTRAVENOUS at 05:32

## 2017-11-25 RX ADMIN — HEPARIN SODIUM 5000 UNITS: 5000 INJECTION, SOLUTION INTRAVENOUS; SUBCUTANEOUS at 13:45

## 2017-11-25 RX ADMIN — DEXTROSE, SODIUM CHLORIDE, AND POTASSIUM CHLORIDE 50 ML/HR: 5; .45; .15 INJECTION INTRAVENOUS at 21:44

## 2017-11-25 RX ADMIN — POTASSIUM CHLORIDE 20 MEQ: 200 INJECTION, SOLUTION INTRAVENOUS at 13:49

## 2017-11-25 NOTE — PROGRESS NOTES
Anesthesia Progress Note    Patient's pain is well controlled, he has been up and walking  Motor/Sensory grossly intact lower extremities  VSS  Plt 170 today  No blood thinners, heparin held since yesterday  Epidural removed, tip intact  Site is c/d/i, no erythema, no tenderness  Nurse aware to hold heparin subcutaneous dose 1 hour from epidural removal (removed at 0800, next dose of heparin not before 0900)  Dr Lopez Slider team will put in pain regimen  Patient's questions addressed   Please call with questions/concerns /[phone]    Dewaine Goodell, MD Anesthesia

## 2017-11-25 NOTE — PROGRESS NOTES
Progress Note - General Surgery   Justa Fine 67 y o  male MRN: [id number]  Unit/Bed#: [facility] 607-01 Encounter: [id number]    Assessment:  66 yo M w duodenal polyp s/p exploratory laparotomyk, resection of distal stomach and 1st portion of duodenum w BR1 reconstruction     -passing flatus  -ambulating  -somewhat distended  -no vomiting  -heparin held overnight     Plan:    -continue to ambulate  -d/c epidural today  -resume prophylactic heparin today  -can have clears today    Subjective/Objective   Chief Complaint:     Subjective:     Objective:     Blood pressure 114/69, pulse 72, temperature 97 9 °F (36 6 °C), temperature source Oral, resp  rate 18, height 6' 2" (1 88 m), weight 95 3 kg (210 lb), SpO2 98 %  ,Body mass index is 26 96 kg/m²  Intake/Output Summary (Last 24 hours) at 11/25/17 5861  Last data filed at 11/25/17 0719   Gross per 24 hour   Intake            510 9 ml   Output             1410 ml   Net           -899 1 ml       Invasive Devices     Peripheral Intravenous Line            Peripheral IV 11/24/17 Right Arm less than 1 day          Epidural Line            Epidural Catheter 11/20/17 4 days          Drain            Closed/Suction Drain Right Abdomen Bulb 19 Fr  4 days                Physical Exam: General:   NAD  Norm resp effort  RRR  Abd soft, NT/ND, NAVJOT in place serosang, incision cdi      Lab, Imaging and other studies:I have personally reviewed pertinent lab results    , CBC: No results found for: WBC, HGB, HCT, MCV, PLT, ADJUSTEDWBC, MCH, MCHC, RDW, MPV, NRBC, CMP: No results found for: NA, K, CL, CO2, ANIONGAP, BUN, CREATININE, GLUCOSE, CALCIUM, AST, ALT, ALKPHOS, PROT, ALBUMIN, BILITOT, EGFR  VTE Pharmacologic Prophylaxis: Sequential compression device (Venodyne)   VTE Mechanical Prophylaxis: sequential compression device

## 2017-11-25 NOTE — ADDENDUM NOTE
Addendum  created 11/25/17 0819 by Henrique Beard MD    Anesthesia Intra LDAs edited, LDA properties accepted, Order list changed

## 2017-11-26 LAB
ANION GAP SERPL CALCULATED.3IONS-SCNC: 8 MMOL/L (ref 4–13)
BUN SERPL-MCNC: 10 MG/DL (ref 5–25)
CALCIUM SERPL-MCNC: 8.7 MG/DL (ref 8.3–10.1)
CHLORIDE SERPL-SCNC: 105 MMOL/L (ref 100–108)
CO2 SERPL-SCNC: 26 MMOL/L (ref 21–32)
CREAT SERPL-MCNC: 0.66 MG/DL (ref 0.6–1.3)
ERYTHROCYTE [DISTWIDTH] IN BLOOD BY AUTOMATED COUNT: 13.1 % (ref 11.6–15.1)
GFR SERPL CREATININE-BSD FRML MDRD: 97 ML/MIN/1.73SQ M
GLUCOSE SERPL-MCNC: 104 MG/DL (ref 65–140)
HCT VFR BLD AUTO: 33.8 % (ref 36.5–49.3)
HGB BLD-MCNC: 11.7 G/DL (ref 12–17)
MCH RBC QN AUTO: 29.2 PG (ref 26.8–34.3)
MCHC RBC AUTO-ENTMCNC: 34.6 G/DL (ref 31.4–37.4)
MCV RBC AUTO: 84 FL (ref 82–98)
PLATELET # BLD AUTO: 187 THOUSANDS/UL (ref 149–390)
PMV BLD AUTO: 8.8 FL (ref 8.9–12.7)
POTASSIUM SERPL-SCNC: 3.7 MMOL/L (ref 3.5–5.3)
RBC # BLD AUTO: 4.01 MILLION/UL (ref 3.88–5.62)
SODIUM SERPL-SCNC: 139 MMOL/L (ref 136–145)
WBC # BLD AUTO: 3.85 THOUSAND/UL (ref 4.31–10.16)

## 2017-11-26 PROCEDURE — 80048 BASIC METABOLIC PNL TOTAL CA: CPT | Performed by: SURGERY

## 2017-11-26 PROCEDURE — 85027 COMPLETE CBC AUTOMATED: CPT | Performed by: SURGERY

## 2017-11-26 RX ORDER — POTASSIUM CHLORIDE 20 MEQ/1
40 TABLET, EXTENDED RELEASE ORAL ONCE
Status: COMPLETED | OUTPATIENT
Start: 2017-11-26 | End: 2017-11-26

## 2017-11-26 RX ORDER — PRAVASTATIN SODIUM 20 MG
20 TABLET ORAL
Status: DISCONTINUED | OUTPATIENT
Start: 2017-11-26 | End: 2017-11-27 | Stop reason: HOSPADM

## 2017-11-26 RX ORDER — CHLORAL HYDRATE 500 MG
1000 CAPSULE ORAL DAILY
Status: DISCONTINUED | OUTPATIENT
Start: 2017-11-26 | End: 2017-11-27 | Stop reason: HOSPADM

## 2017-11-26 RX ORDER — ASPIRIN 81 MG/1
162 TABLET, CHEWABLE ORAL DAILY
Status: DISCONTINUED | OUTPATIENT
Start: 2017-11-26 | End: 2017-11-27 | Stop reason: HOSPADM

## 2017-11-26 RX ORDER — PANTOPRAZOLE SODIUM 40 MG/1
40 TABLET, DELAYED RELEASE ORAL DAILY
Status: DISCONTINUED | OUTPATIENT
Start: 2017-11-26 | End: 2017-11-27 | Stop reason: HOSPADM

## 2017-11-26 RX ADMIN — PANTOPRAZOLE SODIUM 40 MG: 40 TABLET, DELAYED RELEASE ORAL at 09:19

## 2017-11-26 RX ADMIN — OXYCODONE HYDROCHLORIDE 10 MG: 10 TABLET ORAL at 21:03

## 2017-11-26 RX ADMIN — OXYCODONE HYDROCHLORIDE 5 MG: 5 TABLET ORAL at 17:28

## 2017-11-26 RX ADMIN — ASPIRIN 81 MG 162 MG: 81 TABLET ORAL at 09:17

## 2017-11-26 RX ADMIN — HEPARIN SODIUM 5000 UNITS: 5000 INJECTION, SOLUTION INTRAVENOUS; SUBCUTANEOUS at 21:04

## 2017-11-26 RX ADMIN — HEPARIN SODIUM 5000 UNITS: 5000 INJECTION, SOLUTION INTRAVENOUS; SUBCUTANEOUS at 05:06

## 2017-11-26 RX ADMIN — Medication 1000 MG: at 09:18

## 2017-11-26 RX ADMIN — LOSARTAN POTASSIUM: 50 TABLET, FILM COATED ORAL at 09:18

## 2017-11-26 RX ADMIN — OXYCODONE HYDROCHLORIDE 5 MG: 5 TABLET ORAL at 07:11

## 2017-11-26 RX ADMIN — HEPARIN SODIUM 5000 UNITS: 5000 INJECTION, SOLUTION INTRAVENOUS; SUBCUTANEOUS at 13:26

## 2017-11-26 RX ADMIN — PRAVASTATIN SODIUM 20 MG: 20 TABLET ORAL at 17:28

## 2017-11-26 RX ADMIN — POTASSIUM CHLORIDE 40 MEQ: 1500 TABLET, EXTENDED RELEASE ORAL at 09:18

## 2017-11-26 RX ADMIN — OXYCODONE HYDROCHLORIDE 5 MG: 5 TABLET ORAL at 11:36

## 2017-11-26 NOTE — PROGRESS NOTES
Progress Note - General Surgery   Jonathan Lockhart 67 y o  male MRN: [id number]  Unit/Bed#: [facility] 607-01 Encounter: [id number]    Assessment:  70-year-old male status post proximal duodenal resection Billroth 1 reconstruction    - Tolerating PO (850 cc in), + gas    Plan:  Advance to fulls   D/C IVF  PRN pain control- continue PO oxy  Keep NAVJOT drain  OOB, ambulation  PT/OT, dispo planning    Subjective/Objective   Chief Complaint: None    Subjective: No complaints, feels very well  Tolerated clears, 850 cc intake  Passing lots of gas  Pleased w/ pain control s/p epidural removal    Objective:     Blood pressure 136/77, pulse 79, temperature 98 3 °F (36 8 °C), temperature source Oral, resp  rate 18, height 6' 2" (1 88 m), weight 95 3 kg (210 lb), SpO2 96 %  ,Body mass index is 26 96 kg/m²  Intake/Output Summary (Last 24 hours) at 11/26/17 1470  Last data filed at 11/26/17 0524   Gross per 24 hour   Intake          2087 23 ml   Output             2600 ml   Net          -512 77 ml       Invasive Devices     Peripheral Intravenous Line            Peripheral IV 11/24/17 Right Arm 1 day          Drain            Closed/Suction Drain Right Abdomen Bulb 19 Fr  5 days                Physical Exam:   Gen: A&O, NAD  Cardio: RRR  Lungs: CTAB  Abd: Soft, non distended, non tender  NAVJOT serosanginous   Incision c//d/i      Lab, Imaging and other studies:CBC: No results found for: WBC, HGB, HCT, MCV, PLT, ADJUSTEDWBC, MCH, MCHC, RDW, MPV, NRBC, CMP: No results found for: NA, K, CL, CO2, ANIONGAP, BUN, CREATININE, GLUCOSE, CALCIUM, AST, ALT, ALKPHOS, PROT, ALBUMIN, BILITOT, EGFR  VTE Pharmacologic Prophylaxis: Heparin  VTE Mechanical Prophylaxis: sequential compression device

## 2017-11-27 VITALS
BODY MASS INDEX: 26.95 KG/M2 | RESPIRATION RATE: 20 BRPM | HEART RATE: 80 BPM | TEMPERATURE: 98.4 F | WEIGHT: 210 LBS | SYSTOLIC BLOOD PRESSURE: 109 MMHG | DIASTOLIC BLOOD PRESSURE: 67 MMHG | OXYGEN SATURATION: 98 % | HEIGHT: 74 IN

## 2017-11-27 LAB
ANION GAP SERPL CALCULATED.3IONS-SCNC: 6 MMOL/L (ref 4–13)
BASOPHILS # BLD AUTO: 0.01 THOUSANDS/ΜL (ref 0–0.1)
BASOPHILS NFR BLD AUTO: 0 % (ref 0–1)
BUN SERPL-MCNC: 13 MG/DL (ref 5–25)
CALCIUM SERPL-MCNC: 8.6 MG/DL (ref 8.3–10.1)
CHLORIDE SERPL-SCNC: 104 MMOL/L (ref 100–108)
CO2 SERPL-SCNC: 28 MMOL/L (ref 21–32)
CREAT SERPL-MCNC: 0.73 MG/DL (ref 0.6–1.3)
EOSINOPHIL # BLD AUTO: 0.11 THOUSAND/ΜL (ref 0–0.61)
EOSINOPHIL NFR BLD AUTO: 3 % (ref 0–6)
ERYTHROCYTE [DISTWIDTH] IN BLOOD BY AUTOMATED COUNT: 13.1 % (ref 11.6–15.1)
GFR SERPL CREATININE-BSD FRML MDRD: 93 ML/MIN/1.73SQ M
GLUCOSE SERPL-MCNC: 100 MG/DL (ref 65–140)
HCT VFR BLD AUTO: 32.5 % (ref 36.5–49.3)
HGB BLD-MCNC: 11.1 G/DL (ref 12–17)
LYMPHOCYTES # BLD AUTO: 0.7 THOUSANDS/ΜL (ref 0.6–4.47)
LYMPHOCYTES NFR BLD AUTO: 17 % (ref 14–44)
MAGNESIUM SERPL-MCNC: 2.3 MG/DL (ref 1.6–2.6)
MCH RBC QN AUTO: 29.1 PG (ref 26.8–34.3)
MCHC RBC AUTO-ENTMCNC: 34.2 G/DL (ref 31.4–37.4)
MCV RBC AUTO: 85 FL (ref 82–98)
MONOCYTES # BLD AUTO: 0.49 THOUSAND/ΜL (ref 0.17–1.22)
MONOCYTES NFR BLD AUTO: 12 % (ref 4–12)
NEUTROPHILS # BLD AUTO: 2.8 THOUSANDS/ΜL (ref 1.85–7.62)
NEUTS SEG NFR BLD AUTO: 68 % (ref 43–75)
NRBC BLD AUTO-RTO: 0 /100 WBCS
PLATELET # BLD AUTO: 182 THOUSANDS/UL (ref 149–390)
PMV BLD AUTO: 8.9 FL (ref 8.9–12.7)
POTASSIUM SERPL-SCNC: 3.7 MMOL/L (ref 3.5–5.3)
RBC # BLD AUTO: 3.82 MILLION/UL (ref 3.88–5.62)
SODIUM SERPL-SCNC: 138 MMOL/L (ref 136–145)
WBC # BLD AUTO: 4.12 THOUSAND/UL (ref 4.31–10.16)

## 2017-11-27 PROCEDURE — 85025 COMPLETE CBC W/AUTO DIFF WBC: CPT | Performed by: SURGERY

## 2017-11-27 PROCEDURE — 80048 BASIC METABOLIC PNL TOTAL CA: CPT | Performed by: SURGERY

## 2017-11-27 PROCEDURE — 83735 ASSAY OF MAGNESIUM: CPT | Performed by: SURGERY

## 2017-11-27 RX ORDER — OXYCODONE HYDROCHLORIDE 5 MG/1
5 TABLET ORAL EVERY 4 HOURS PRN
Qty: 30 TABLET | Refills: 0 | Status: SHIPPED | OUTPATIENT
Start: 2017-11-27 | End: 2017-12-07

## 2017-11-27 RX ADMIN — ASPIRIN 81 MG 162 MG: 81 TABLET ORAL at 09:15

## 2017-11-27 RX ADMIN — HEPARIN SODIUM 5000 UNITS: 5000 INJECTION, SOLUTION INTRAVENOUS; SUBCUTANEOUS at 06:07

## 2017-11-27 RX ADMIN — PANTOPRAZOLE SODIUM 40 MG: 40 TABLET, DELAYED RELEASE ORAL at 09:15

## 2017-11-27 RX ADMIN — LOSARTAN POTASSIUM: 50 TABLET, FILM COATED ORAL at 09:14

## 2017-11-27 RX ADMIN — Medication 1000 MG: at 09:15

## 2017-11-27 RX ADMIN — OXYCODONE HYDROCHLORIDE 10 MG: 10 TABLET ORAL at 09:15

## 2017-11-27 RX ADMIN — OXYCODONE HYDROCHLORIDE 5 MG: 5 TABLET ORAL at 00:36

## 2017-11-27 NOTE — DISCHARGE SUMMARY
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  He was found to have a recurrence and had a staging workup with a PET/CT  There was intense uptake in the proximal duodenum with an SUV of 14 7  An intraluminal density was seen  The gastric antrum was dilated  I personally reviewed the films  The patient comes in now to discuss further therapy  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  Patient then underwent EGD on October 16, 2017  This revealed a large duodenal bulb mass protruding through the pylorus  This was friable  The duodenum could not be entered  Pathology revealed tubular adenoma with extensive high-grade dysplasia  The patient denies any abdominal pain, nausea or vomiting, he does have some occasional bloating but no significant reflux or nausea  No unintentional weight loss  Procedures Performed: 11/20/17 Exploratory laparotomy, resection distal stomach, first portion of duodenum, Bilroth 1 reconstruction - Dr Jr Carson Course: Patient has done extremely well post operatively  He had his nasogastric tube in for the first 5 days post operatively when it inadvertently came loose and half way out  The nasogastric tube was then removed and the patient remained nothing by mouth for the next few days  He was slowly started on a clear liquid diet and advanced to a house diet  His ana maría cheung drain was removed prior to his discharge being that it was serous in nature and minimal drainage over 24 hours  Patient will be discharged home with a script for Oxycodone 5 mg every 4-6 hours as needed for pain  # 30 were dispensed, no refills   Patient will follow with Dr Milena Celestin on 12/5/17 at 2:30 pm at the [facility] office  Pathology pending    Complications: None    Discharge Diagnosis: Duodenal polyp    Discharge Date: 11/27/17    Condition at Discharge: Good     Discharge instructions/Information to patient and family:   See after visit summary for information provided to patient and family  Provisions for Follow-Up Care:  See after visit summary for information related to follow-up care and any pertinent home health orders  Disposition: Home    Planned Readmission: No    Discharge Statement   I spent 45 minutes discharging the patient  This time was spent on the day of discharge  I had direct contact with the patient on the day of discharge  Additional documentation is required if more than 30 minutes were spent on discharge  Discharge Medications:  See after visit summary for reconciled discharge medications provided to patient and family

## 2017-11-27 NOTE — MEDICAL STUDENT
Progress Note - General Surgery   Outsmart Sports 67 y o  male MRN: [id number]  Unit/Bed#: [facility] 607-01 Encounter: [id number]    Assessment:  66 yo male POD#7 s/p distal stomach and proximal duodenum resection, Biliroth 1 reconstruction on 11/20  Plan:  -continue regular diet  -OOB, ambulate  -d/c NAVJOT today  -plan for d/c today or tomorrow    Subjective/Objective     Subjective: Doing well overnight  Had BM yesterday  Tolerating PO, denies nausea/vomiting  Ambulating  NAVJOT with serosanguinous fluid  Objective:     Blood pressure 119/67, pulse 74, temperature 98 8 °F (37 1 °C), temperature source Oral, resp  rate 16, height 6' 2" (1 88 m), weight 95 3 kg (210 lb), SpO2 98 %  ,Body mass index is 26 96 kg/m²        Intake/Output Summary (Last 24 hours) at 11/27/17 3691  Last data filed at 11/26/17 2112   Gross per 24 hour   Intake              860 ml   Output             2130 ml   Net            -1270 ml       Invasive Devices     Peripheral Intravenous Line            Peripheral IV 11/24/17 Right Arm 2 days          Drain            Closed/Suction Drain Right Abdomen Bulb 19 Fr  6 days                Physical Exam: General appearance: alert, appears stated age and cooperative  Lungs: normal respiratory effort  Abdomen: soft, non-tender; mid-line incision clean, dry, intact with staples  Neurologic: Grossly normal    Lab, Imaging and other studies:  Lab Results   Component Value Date    WBC 3 85 (L) 11/26/2017    HGB 11 7 (L) 11/26/2017    HCT 33 8 (L) 11/26/2017    MCV 84 11/26/2017     11/26/2017     Lab Results   Component Value Date    GLUCOSE 104 11/26/2017    CALCIUM 8 7 11/26/2017     11/26/2017    K 3 7 11/26/2017    CO2 26 11/26/2017     11/26/2017    BUN 10 11/26/2017    CREATININE 0 66 11/26/2017       Annamarie Snow, MS-3

## 2017-11-27 NOTE — PROGRESS NOTES
Progress Note - General Surgery   Ankush Richard 67 y o  male MRN: [id number]  Unit/Bed#: [facility] 607-01 Encounter: [id number]    Assessment:  40-year-old male status post proximal duodenal resection Billroth 1 reconstruction    Plan:  Regular diet  D/c NAVJOT today  Pain control  OOB/ambulation  dispo planning    Subjective/Objective   Chief Complaint:     Subjective: Having flatus/BM  Tolerating PO  Ambulating  NAVJOT serosang  Objective:     Blood pressure 119/67, pulse 74, temperature 98 8 °F (37 1 °C), temperature source Oral, resp  rate 16, height 6' 2" (1 88 m), weight 95 3 kg (210 lb), SpO2 98 %  ,Body mass index is 26 96 kg/m²  Intake/Output Summary (Last 24 hours) at 11/27/17 0442  Last data filed at 11/26/17 2112   Gross per 24 hour   Intake              860 ml   Output             3430 ml   Net            -2570 ml       Invasive Devices     Peripheral Intravenous Line            Peripheral IV 11/24/17 Right Arm 2 days          Drain            Closed/Suction Drain Right Abdomen Bulb 19 Fr  6 days                Physical Exam: AAOx3  NAD  RRR  Normal respiratory effort   Soft, NT, ND  Incisions c/d/i    Lab, Imaging and other studies:I have personally reviewed pertinent lab results      VTE Pharmacologic Prophylaxis: Heparin  VTE Mechanical Prophylaxis: sequential compression device

## 2017-12-01 ENCOUNTER — GENERIC CONVERSION - ENCOUNTER (OUTPATIENT)
Dept: OTHER | Facility: OTHER | Age: 72
End: 2017-12-01

## 2017-12-05 ENCOUNTER — GENERIC CONVERSION - ENCOUNTER (OUTPATIENT)
Dept: OTHER | Facility: OTHER | Age: 72
End: 2017-12-05

## 2017-12-07 ENCOUNTER — GENERIC CONVERSION - ENCOUNTER (OUTPATIENT)
Dept: OTHER | Facility: OTHER | Age: 72
End: 2017-12-07

## 2018-01-09 ENCOUNTER — GENERIC CONVERSION - ENCOUNTER (OUTPATIENT)
Dept: OTHER | Facility: OTHER | Age: 73
End: 2018-01-09

## 2018-01-11 NOTE — PROGRESS NOTES
Preliminary Nursing Report                Patient Information    Initial Encounter Entry Date:   2017 11:29 AM EST (Automated Transmission Automated Transmission)       Last Modified:   {Chandler Pulido}              Legal Name: Bob Yee        Social Security Number:        YOB: 1945        Age (years): 67        Gender: M        Body Mass Index (BMI): 27 kg/m2        Height: 74 in  Weight: 214 lbs (97 kgs)           Address:   [address] [place] [address]              Phone: [phone]   (consent to leave messages)        Email:        Ethnicity: Decline to State        Mosque:        Marital Status:        Preferred Language: English        Race: Other Race                    Patient Insurance Information        Primary Insurance Information Carrier Name: {Primary  CarrierName}           Carrier Address:   {Primary  CarrierAddress}              Carrier Phone: {Primary  CarrierPhone}          Group Number: {Primary  GroupNumber}          Policy Number: {Primary  PolicyNumber}          Insured Name: {Primary  InsuredName}          Insured : {Primary  InsuredDOB}          Relationship to Insured: {Primary  RelationshiptoInsured}           Secondary Insurance Information Carrier Name: {Secondary  CarrierName}           Carrier Address:   {Secondary  CarrierAddress}              Carrier Phone: {Secondary  CarrierPhone}          Group Number: {Secondary  GroupNumber}          Policy Number: {Secondary  PolicyNumber}          Insured Name: {Secondary  InsuredName}          Insured : {Secondary  InsuredDOB}          Relationship to Insured: {Secondary  RelationshiptoInsured}                       Health Profile   Booking #:   [place] Code #: [id number]               DOS: 2017    Surgery : REMOVAL STOMACH, PARTIAL      Add'l Procedures/Notes:     Surgery Risk: Intermediate          Precautions          Allergies    Percocet TABS       Clinical Comments: Reaction Type: , Reaction: , Severity:              Medications    Adult Aspirin EC Low Strength 81 MG Oral Tablet Delayed Release       Losartan Potassium-HCTZ 100-25 MG Oral Tablet       Montelukast Sodium 10 MG Oral Tablet       Pantoprazole Sodium 40 MG Oral Tablet Delayed Release       Simvastatin 20 MG Oral Tablet               Conditions    Benign essential hypertension       Diverticulitis of colon       Duodenal adenoma       History of allergy       Hyperlipidemia       Vocal cord cancer               Family History    None             Surgical History    None             Social History    Former smoker       No alcohol use                               Patient Instructions       Medical Procedure Risk  NPO Instructions   The day before surgery it is recommended to have a light dinner at your usual time and you are allowed a light snack early in the evening  Do not eat anything heavy or eat a big meal after 7pm  Do not eat or drink anything after midnight prior to your surgery  If you are supposed to take any of your medications, do so with a sip of water  Failure to follow these instructions can lead to an increased risk of lung complications and may result in a delay or cancellation of your procedure  If you have any questions, contact your institution for further instructions  No candy, no gum, no mints, no chewing tobacco          Losartan Potassium-HCTZ 100-25 MG Oral Tablet  Medication Instruction (ACE/ARB - Blood Pressure Medication) 1  Please continue the following medications up to the evening before surgery, but do not take it on the day of surgery  Please restart your medications as soon as clinically feasible  Adult Aspirin EC Low Strength 81 MG Oral Tablet Delayed Release  Medication Instruction (Aspirin - Blood Thinners) 112, 104, 105, 114, 113, 103, 110, 107, 108, 109, 111, 106  Please continue to take this medication on your normal schedule   If this is an oral medication and you take in the morning, you may do so with a sip of water  However, your surgeon may have you stop aspirin up to a week early if you are having intracranial, middle ear, posterior eye, spine surgery or prostate surgery  Montelukast Sodium 10 MG Oral Tablet  Medication Instruction (Leukotriene Inhibitors - Asthma Medication) 48  Please continue to take this medication on your normal schedule  If this is an oral medication and you take in the morning, you may do so with a sip of water  Testing Considerations       ? Basic Metabolic Panel (BMP) t  If test was completed and normal within last six months, repeat test is not necessary  Triggered by: Benign essential hypertension         ? Complete Blood Count (CBC) t, client, client  If test was completed and normal within last six months, repeat test is not necessary  Triggered by: Age or Facility Rec         ? Electrocardiogram (ECG) t  Patient does not need new test if normal ECG is present within the last six months and no change in clinical condition  Triggered by: Benign essential hypertension, Age or Facility Rec         ? Type and Screen client  Type and Screen - Blood: If there is anticipated or possible large blood loss with this procedure, then a Type and Screen for Blood should be ordered  Triggered by: Age or Facility Rec               Consultations       No recommendations for this classification  Miscellaneous Questions         Question: Are you able to walk up a flight of stairs, walk up a hill or do heavy housework WITHOUT having chest pain or shortness of breath? Answer: YES                   Allergies/Conditions/Medications Not Found        The following were not recognized by our system when generating the recommendations  Please consider if this would impact any preoperative protocols  ? No alcohol use       ? Pantoprazole Sodium 40 MG Oral Tablet Delayed Release       ?  Simvastatin 20 MG Oral Tablet Appointment Information         Date:    11/20/2017        Location:    [facility]        Address:           Directions:                      Footnotes revision 14      ?? Denotes a free-text entry  Legal Disclaimer: Any and all recommendations and services provided herein are designed to assist in the preoperative care of the patient  Nothing contained herein is designed to replace, eliminate or alleviate the responsibility of the attending physician to supervise and determine the patient?s preoperative care and course of treatment  Failure to provide complete, accurate information may negatively impact the system?s ability to recommend the proper preoperative protocol  THE ATTENDING PHYSICIAN IS RESPONSIBLE TO REVIEW THE SUGGESTED PREOPERATIVE PROTOCOLS/COURSE OF TREATMENT AND PRESCRIBE THE FINAL COURSE OF PREOPERATIVE TREATMENT IN CONSULTATION WITH THE PATIENT  THE ePREOP SYSTEM AND ITS MATERIALS ARE PROVIDED ? AS IS? WITHOUT WARRANTY OF ANY KIND, EXPRESS OR IMPLIED, INCLUDING, BUT NOT LIMITED TO, WARRANTIES OF PERFORMANCE OR MERCHANTABILITY OR FITNESS FOR A PARTICULAR PURPOSE  PATIENT AND PHYSICIANS HEREBY AGREE THAT THEIR USE OF THE MATERIALS AND RESOURCES ACT AS A CONSENT TO RELEASE AND WAIVE ePREOP FROM ANY AND ALL CLAIMS OF WARRANTY, TORT OR CONTRACT LAW OF ANY KIND  Electronically signed by:Maxwell MCCLURE    Nov 7 2017  1:17PM EST

## 2018-01-13 VITALS
DIASTOLIC BLOOD PRESSURE: 70 MMHG | SYSTOLIC BLOOD PRESSURE: 112 MMHG | TEMPERATURE: 97.7 F | WEIGHT: 214 LBS | RESPIRATION RATE: 18 BRPM | BODY MASS INDEX: 27.46 KG/M2 | HEART RATE: 70 BPM | HEIGHT: 74 IN

## 2018-01-23 NOTE — MISCELLANEOUS
Message  Called pt to discuss outcome of UGI working group  Suggested he see one of the medical oncologists to discuss chemo based on aggressive nature of tumor  He explained that he has an appt tomorrow with [facility] surgeon for tx of throat cancer, feels that needs to take priority at this time  Info relayed to Dr Srini Danielle  Active Problems    1  Benign essential hypertension (401 1) (I10)   2  Diverticulitis of colon (562 11) (K57 32)   3  Duodenal adenoma (211 2) (D13 2)   4  History of allergy (V15 09) (Z88 9)   5  Hyperlipidemia (272 4) (E78 5)   6  Nausea (787 02) (R11 0)   7  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)    Current Meds   1  Adult Aspirin EC Low Strength 81 MG Oral Tablet Delayed Release; Therapy: (Recorded:02Nov2017) to Recorded   2  Losartan Potassium-HCTZ 100-25 MG Oral Tablet; Therapy: (Recorded:02Nov2017) to Recorded   3  Montelukast Sodium 10 MG Oral Tablet; Therapy: (Zeynep Kale) to Recorded   4  Ondansetron HCl - 8 MG Oral Tablet; TAKE 1 TABLET Every 8 hours PRN for nausea; Therapy: [id number] to (Docia Elm)  Requested for: [id number]; Last   Rx:29Nov2017 Ordered   5  Pantoprazole Sodium 40 MG Oral Tablet Delayed Release; Therapy: (Zeynep Kale) to Recorded   6  Simvastatin 20 MG Oral Tablet; Therapy: (Recorded:02Nov2017) to Recorded    Allergies    1  Percocet TABS    Signatures   Electronically signed by :  Angel Villasenor, ; Jan 9 2018 10:07AM EST                       (Author)

## 2018-01-23 NOTE — MISCELLANEOUS
Message  His case was discussed at upper GI working group  Chemotherapy was recommended  I discussed with the patient's wife this recommendation and to follow up with Medical Oncology here  They are seeing specialists at [facility] tomorrow and I told this to discuss this with 1 of the chemotherapy doctors at [facility]  I will see him again at his regular visit in 6 months        Signatures   Electronically signed by : GAUTAM Veloz ; Jan 9 2018 11:15AM EST                       (Author)

## 2018-01-23 NOTE — MISCELLANEOUS
Discussion/Summary  Discussion Summary:   I discussed his pathology with him  This is a L8wA6O1 adenocarcinoma of the duodenum  There was a possible focus going into the muscularis, but not through this  There was lymphovascular invasion, but his for regional lymph nodes were negative  I discussed observation as he does not wish to have further surgical resection  I think this is reasonable for a T1a tumor  I have recommended repeating his CT in 6 months  He should also have a repeat upper endoscopy in 6 months  He will now coordinate treatment for his recurrent laryngeal cancer  All his questions were answered        Signatures   Electronically signed by : GAUTAM Gimenez ; Dec  7 2017 12:23PM EST                       (Author)

## 2018-01-24 VITALS
HEART RATE: 72 BPM | DIASTOLIC BLOOD PRESSURE: 70 MMHG | TEMPERATURE: 98.5 F | HEIGHT: 74 IN | RESPIRATION RATE: 16 BRPM | WEIGHT: 200 LBS | SYSTOLIC BLOOD PRESSURE: 110 MMHG | BODY MASS INDEX: 25.67 KG/M2

## 2018-01-24 VITALS
HEIGHT: 74 IN | OXYGEN SATURATION: 97 % | TEMPERATURE: 97.3 F | RESPIRATION RATE: 16 BRPM | HEART RATE: 85 BPM | WEIGHT: 206 LBS | SYSTOLIC BLOOD PRESSURE: 122 MMHG | DIASTOLIC BLOOD PRESSURE: 80 MMHG | BODY MASS INDEX: 26.44 KG/M2

## 2022-01-14 NOTE — OP NOTE
Mucinex BID X 10 days.   Doxycycline 100mg BID x 10 days.   Increase fluid hydration-- 8 to 10 glasses of fluid.   Tylenol 650mg every six hours as needed for pain and for fever.   COVID 19 PCR curbside.   Isolate until results are available.        OPERATIVE REPORT  PATIENT NAME: Johnathon Steward    :  1945  MRN: [id number]  Pt Location: [facility] OR ROOM 05    SURGERY DATE: 2017    Surgeon(s) and Role:     * Julia Jesus DO - Primary    Preop Diagnosis:  Malignant neoplasm of glottis [C32 0]    Post-Op Diagnosis Codes:     * Malignant neoplasm of glottis [C32 0]    Procedure(s) (LRB):  LARYNGOSCOPY DIRECTMICRO WITH BIOPSY OF GLOTTIS LESION (N/A)    Specimen(s):  ID Type Source Tests Collected by Time Destination   1 : Left vocal cord and anterior commissure  Tissue Vocal cord TISSUE EXAM Julia Jesus DO 2017 0825        Estimated Blood Loss:   Minimal    Drains:       Anesthesia Type:   General    Operative Indications:  Malignant neoplasm of glottis [C32 0]      Operative Findings:  Exophytic lesion of the left anterior vocal cord on both the medial and inferior surface extending to the anterior commissure    Complications:   None    Procedure and Technique:  Patient was identified in the holding area  He was taken back to the operating room and placed on the OR table in supine position  He was placed under general anesthesia with a 6 5 endotracheal tube  This was marked but not taped to the lip  Table was turned 90° and was placed on a shoulder roll with head in slight extension  Formal time-out was obtained and patient name, date of birth, and procedure were noted to be correct  Dedo laryngoscope was used to evaluate the larynx  This was held in place with the Lewy suspension and a large chest pad  Both 0 degree and 30 degree endoscopes were used to evaluate the larynx and multiple photos were obtained  This demonstrated an exophytic lesion which was white in color on the anterior half of the vocal cord on both the superior, medial, and inferior surface  This extended to the area of the anterior commissure  Multiple biopsies were taken of the site   At this point a neuro pledget with Afrin was placed in between the vocal cords and the laryngoscope was removed  The upper dental guard was removed  Patient was returned back to normal position  At the time of extubation the neuro rian was removed with the endotracheal tube  Patient was taken to recovery room in stable condition     I was present for the entire procedure    Patient Disposition:  PACU     SIGNATURE: Seamus Aleman DO  DATE: September 26, 2017  TIME: 8:35 AM

## (undated) DEVICE — SUT SILK 2-0 18 IN A185H

## (undated) DEVICE — SUT VICRYL 3-0 SH 27 IN J416H

## (undated) DEVICE — ENDOSCOPIC LINEAR CUTTER RELOADS BLUE 3.5MM: Brand: ECHELON; ENDOPATH

## (undated) DEVICE — TUBING SUCTION 5MM X 12 FT

## (undated) DEVICE — SURGICEL 4 X 8

## (undated) DEVICE — SUT SILK 3-0 18 IN A184H

## (undated) DEVICE — X-RAY DETECTABLE SPONGES,16 PLY: Brand: VISTEC

## (undated) DEVICE — GAUZE SPONGES,16 PLY: Brand: CURITY

## (undated) DEVICE — DRAPE SHEET THREE QUARTER

## (undated) DEVICE — TELFA NON-ADHERENT ABSORBENT DRESSING: Brand: TELFA

## (undated) DEVICE — MEDI-VAC YANK SUCT HNDL W/TPRD BULBOUS TIP: Brand: CARDINAL HEALTH

## (undated) DEVICE — 3000CC GUARDIAN II: Brand: GUARDIAN

## (undated) DEVICE — GLOVE SRG BIOGEL ECLIPSE 8

## (undated) DEVICE — SUT PROLENE 3-0 SH 36 IN 8522H

## (undated) DEVICE — THE ECHELON FLEX POWERED PLUS ARTICULATING ENDOSCOPIC LINEAR CUTTERS ARE STERILE, SINGLE PATIENT USE INSTRUMENTS THAT SIMULTANEOUSLYCUT AND STAPLE TISSUE. THERE ARE SIX STAGGERED ROWS OF STAPLES, THREE ON EITHER SIDE OF THE CUT LINE. THE ECHELON FLEX 45 POWERED PLUSINSTRUMENTS HAVE A STAPLE LINE THAT IS APPROXIMATELY 45 MM LONG AND A CUT LINE THAT IS APPROXIMATELY 42 MM LONG. THE SHAFT CAN ROTATE FREELYIN BOTH DIRECTIONS AND AN ARTICULATION MECHANISM ENABLES THE DISTAL PORTION OF THE SHAFT TO PIVOT TO FACILITATE LATERAL ACCESS TO THE OPERATIVESITE.THE INSTRUMENTS ARE PACKAGED WITH A PRIMARY LITHIUM BATTERY PACK THAT MUST BE INSTALLED PRIOR TO USE. THERE ARE SPECIFIC REQUIREMENTS FORDISPOSING OF THE BATTERY PACK. REFER TO THE BATTERY PACK DISPOSAL SECTION.THE INSTRUMENTS ARE PACKAGED WITHOUT A RELOAD AND MUST BE LOADED PRIOR TO USE. A STAPLE RETAINING CAP ON THE RELOAD PROTECTS THE STAPLE LEGPOINTS DURING SHIPPING AND TRANSPORTATION. THE INSTRUMENTS’ LOCK-OUT FEATURE IS DESIGNED TO PREVENT A USED OR IMPROPERLY INSTALLED RELOADFROM BEING REFIRED OR AN INSTRUMENT FROM BEING FIRED WITHOUT A RELOAD.: Brand: ECHELON FLEX

## (undated) DEVICE — GLOVE INDICATOR PI UNDERGLOVE SZ 8 BLUE

## (undated) DEVICE — 3M™ IOBAN™ 2 ANTIMICROBIAL INCISE DRAPE 6650EZ: Brand: IOBAN™ 2

## (undated) DEVICE — 3M™ TEGADERM™ TRANSPARENT FILM DRESSING FRAME STYLE, 1626W, 4 IN X 4-3/4 IN (10 CM X 12 CM), 50/CT 4CT/CASE: Brand: 3M™ TEGADERM™

## (undated) DEVICE — INTENDED FOR TISSUE SEPARATION, AND OTHER PROCEDURES THAT REQUIRE A SHARP SURGICAL BLADE TO PUNCTURE OR CUT.: Brand: BARD-PARKER SAFETY BLADES SIZE 10, STERILE

## (undated) DEVICE — NEURO PATTIES 1/2 X 3

## (undated) DEVICE — PLUMEPEN PRO 10FT

## (undated) DEVICE — JP CHANNEL DRAIN 19FR, FULL FLUTES: Brand: JACKSON-PRATT

## (undated) DEVICE — TRAY FOLEY 16FR URIMETER SURESTEP

## (undated) DEVICE — SPECIMEN CONTAINER STERILE PEEL PACK

## (undated) DEVICE — DRAIN SPONGES,6 PLY: Brand: EXCILON

## (undated) DEVICE — SUT SILK 2-0 SH 30 IN K833H

## (undated) DEVICE — CURVED INTRALUMINAL STAPLER (ILS) 20 TITANIUM ADJUSTABLE HEIGHT STAPLES

## (undated) DEVICE — CHLORAPREP HI-LITE 26ML ORANGE

## (undated) DEVICE — ANTI-FOG SOLUTION WITH FOAM PAD: Brand: DEVON

## (undated) DEVICE — INTENDED FOR TISSUE SEPARATION, AND OTHER PROCEDURES THAT REQUIRE A SHARP SURGICAL BLADE TO PUNCTURE OR CUT.: Brand: BARD-PARKER SAFETY BLADES SIZE 15, STERILE

## (undated) DEVICE — SUT SILK 3-0 SH CR/8 18 IN C013D

## (undated) DEVICE — LIGACLIP MCA MULTIPLE CLIP APPLIERS, 20 MEDIUM CLIPS: Brand: LIGACLIP

## (undated) DEVICE — SUT SILK 3-0 SH 30 IN K832H

## (undated) DEVICE — BOWL: 16OZ PEELPOUCH 75/CS 16/PLT: Brand: MEDEGEN MEDICAL PRODUCTS, LLC

## (undated) DEVICE — JACKSON-PRATT 100CC BULB RESERVOIR: Brand: CARDINAL HEALTH

## (undated) DEVICE — GLOVE SRG LF STRL BGL SKNSNS 7 PF

## (undated) DEVICE — 3M™ TEGADERM™ TRANSPARENT FILM DRESSING FRAME STYLE, 1628, 6 IN X 8 IN (15 CM X 20 CM), 10/CT 8CT/CASE: Brand: 3M™ TEGADERM™

## (undated) DEVICE — GLOVE SRG BIOGEL ORTHOPEDIC 7

## (undated) DEVICE — 2000CC GUARDIAN II: Brand: GUARDIAN

## (undated) DEVICE — STERILE MAJOR GENERAL PACK: Brand: CARDINAL HEALTH

## (undated) DEVICE — SUT SILK 2-0 SH CR/8 18 IN C012D

## (undated) DEVICE — STERILIZATION TEETH GUARDS

## (undated) DEVICE — BLANKET HYPOTHERMIA ADULT GAYMAR

## (undated) DEVICE — MEDI-VAC YANKAUER SUCTION HANDLE W/BULBOUS AND CONTROL VENT: Brand: CARDINAL HEALTH

## (undated) DEVICE — TIBURON SPLIT SHEET: Brand: CONVERTORS

## (undated) DEVICE — POOLE SUCTION HANDLE: Brand: CARDINAL HEALTH

## (undated) DEVICE — SCD SEQUENTIAL COMPRESSION COMFORT SLEEVE MEDIUM KNEE LENGTH: Brand: KENDALL SCD

## (undated) DEVICE — MAYO STAND COVER: Brand: CONVERTORS

## (undated) DEVICE — SUT VICRYL 2-0 D-SPECIAL 27 IN D8114

## (undated) DEVICE — SKIN MARKER DUAL TIP WITH RULER CAP, FLEXIBLE RULER AND LABELS: Brand: DEVON

## (undated) DEVICE — LIGACLIP MCA MULTIPLE CLIP APPLIERS, 20 SMALL CLIPS: Brand: LIGACLIP

## (undated) DEVICE — TOWEL SET X-RAY